# Patient Record
Sex: MALE | Race: WHITE
[De-identification: names, ages, dates, MRNs, and addresses within clinical notes are randomized per-mention and may not be internally consistent; named-entity substitution may affect disease eponyms.]

---

## 2017-01-27 ENCOUNTER — HOSPITAL ENCOUNTER (EMERGENCY)
Dept: HOSPITAL 62 - ER | Age: 67
Discharge: HOME | End: 2017-01-27
Payer: OTHER GOVERNMENT

## 2017-01-27 VITALS — DIASTOLIC BLOOD PRESSURE: 84 MMHG | SYSTOLIC BLOOD PRESSURE: 126 MMHG

## 2017-01-27 DIAGNOSIS — Z90.49: ICD-10-CM

## 2017-01-27 DIAGNOSIS — Z96.641: ICD-10-CM

## 2017-01-27 DIAGNOSIS — I10: ICD-10-CM

## 2017-01-27 DIAGNOSIS — K59.00: Primary | ICD-10-CM

## 2017-01-27 PROCEDURE — 99283 EMERGENCY DEPT VISIT LOW MDM: CPT

## 2017-01-27 PROCEDURE — 74000: CPT

## 2017-01-27 NOTE — ER DOCUMENT REPORT
ED GI/





- General


Mode of Arrival: Ambulatory


Information source: Patient


TRAVEL OUTSIDE OF THE [place] IN LAST 30 DAYS: No





- HPI


Patient complains to provider of: Other - Constipation


Timing/Duration: Gradual, Worse


Associated symptoms: None





<MILDRED GOINS - Last Filed: 17 10:10>





<ADAN PENN - Last Filed: 17 11:48>





- General


Chief Complaint: Constipation


Stated Complaint: POSSIBLE CONSTIPATION


Notes: 


Patient is a 66-year-old male presenting to the emergency department concerned 

of constipation.  Patient states that his last bowel movement was last week.  

Patient has a history of constipation, and states that his last episode was a 

few months ago, but it was not this bad.  Patient was seen here as well 2015 for constipation.  Patient denies taking any daily medications for his 

bowel movements, but states he is going to start taking something after this 

episode. 


 (MILDRED GOINS)





- Related Data


Allergies/Adverse Reactions: 


 





No Known Allergies Allergy (Verified 17 09:01)


 











Past Medical History





- General


Information source: Patient





- Social History


Smoking Status: Unknown if Ever Smoked


Chew tobacco use (# tins/day): No


Frequency of alcohol use: None


Drug Abuse: None


Family History: Reviewed & Not Pertinent, CAD - Mother  from heart problems

, Other - Father  from alcoholic cirrhosis


Patient has suicidal ideation: No


Patient has homicidal ideation: No





- Past Medical History


Cardiac Medical History: Reports: Hx Hypertension


GI Medical History: Reports: Hx Gastroesophageal Reflux Disease, Hx Hepatitis


Musculoskeltal Medical History: Reports Hx Arthritis


Traumatic Medical History: Reports: Hx Spine Fracture


Infectious Medical History: Reports: Hx Hepatitis


Past Surgical History: Reports: Hx Cholecystectomy, Hx Nose Surgery, Hx 

Orthopedic Surgery - Total right shoulder, total right hip, vertebrae





- Immunizations


Hx Diphtheria, Pertussis, Tetanus Vaccination: Yes





<MILDRED GOINS - Last Filed: 17 10:10>





Review of Systems





- Review of Systems


Constitutional: No symptoms reported


EENT: No symptoms reported


Cardiovascular: No symptoms reported


Respiratory: No symptoms reported


Gastrointestinal: See HPI, Constipation


Genitourinary: No symptoms reported


Male Genitourinary: No symptoms reported


Musculoskeletal: No symptoms reported


Skin: No symptoms reported


Hematologic/Lymphatic: No symptoms reported


Neurological/Psychological: No symptoms reported


-: Yes All other systems reviewed and negative





<MILDRED GOINS - Last Filed: 17 10:10>





Physical Exam





- Vital signs


Interpretation: Normal





- General


General appearance: Appears well, Alert





- HEENT


Head: Normocephalic, Atraumatic


Eyes: Normal


Pupils: PERRL





- Respiratory


Respiratory status: No respiratory distress


Breath sounds: Normal





- Cardiovascular


Rhythm: Regular





- Abdominal


Inspection: Normal


Distension: No distension


Bowel sounds: Normal


Tenderness: Nontender





- Back


Back: Normal





- Extremities


General upper extremity: Normal inspection


General lower extremity: Normal inspection





- Neurological


Neuro grossly intact: Yes


Cognition: Normal


Xavier Coma Scale Eye Opening: Spontaneous


Xavier Coma Scale Verbal: Oriented


Xavier Coma Scale Motor: Obeys Commands


Xavier Coma Scale Total: 15


Speech: Normal





- Psychological


Associated symptoms: Normal affect, Normal mood





- Skin


Skin Temperature: Warm


Skin Moisture: Dry


Skin Color: Normal





<MILDRED GOINS - Last Filed: 17 10:10>





Course





<MILDRED GOINS - Last Filed: 17 10:10>





- Diagnostic Test


Radiology reviewed: Image reviewed, Reports reviewed - Moderate stool 

throughout the colon





<ADAN PENN - Last Filed: 17 11:48>





- Re-evaluation


Re-evalutation: 





17 11:46


Patient reports good results from the enema, states he feels like there is more 

up inside there. (ADAN PENN)





- Vital Signs


Vital signs: 


 











Temp Pulse Resp BP Pulse Ox


 


 97.9 F   75   16   112/72   96 


 


 17 09:01  17 09:01  17 09:01  17 09:01  17 09:01








 (MILDRED GOINS)


 (ADAN PENN)





Discharge





<MILDRED GOINS - Last Filed: 17 10:10>





<ADAN PENN - Last Filed: 17 11:48>





- Discharge


Clinical Impression: 


Constipation


Qualifiers:


 Constipation type: unspecified constipation type Qualified Code(s): K59.00 - 

Constipation, unspecified





Condition: Stable


Disposition: HOME, SELF-CARE


Additional Instructions: 


Constipation:





     Constipation is a common problem.  It is especially likely as you get 

older.  Constipation is a common cause of abdominal pain, but sometimes causes 

no symptoms at all.  Causes of constipation include certain medications, 

dehydration, diets, inactivity, and low-fiber intake.  Rarely, it can be a 

symptom of underlying disease.  The physician has evaluated you for this.


     Avoid constipation by eating a diet high in fiber, fruits, and vegetables.

  Drink plenty of liquids.  Get regular exercise.  If possible, avoid 

constipating medicines like narcotic pain medication.  Some vitamin tablets can 

cause constipation.  Stool softeners may be needed for difficult cases.  An 

excellent stool softener is Konsyl which is available at [employer] [facility] [employer].  Just add a teaspoon to a glass of pineapple or orange 

juice daily or twice a day if needed.


   Laxatives are useful for occasional constipation.  You should use them only 

when necessary.  Too-frequent use can make your bowels dependent on them.  Some 

over the counter laxatives available without prescription are:





   Milk of Magnesia, 1-2 tablespoons twice a day


    Dulcolax, 5 mg pill or 10 mg suppository.


   Citrate of Magnesia, 4-5 ounces a day for a day or two





For acute constipation, Fleet's Enemas and Dulcolax suppositories are helpful.


     Chronic, long term  use of laxatives or enemas is not a good idea.  Your 

bowel may become dependant on them.  You do not need to have a bowel movement 

every day.  Many people do fine with a bowel movement every three or four days.


     You should call your doctor or return for re-evaluation if you pass blood 

in the stool, or if you develop fever or increasing abdominal pain.








TAKE MiraLax EVERY DAY.


DRINK PLENTY OF WATER.


IT MAY TAKE A FEW DAYS TO GET THE BOWELS MOVING WELL.


WALKING HELPS KEEP THE BOWELS MOVING.


FOLLOW UP WITH YOUR DOCTOR IF NOT IMPROVING.





RETURN TO THE EMERGENCY ROOM IF ANY NEW OR WORSENING SYMPTOMS.








Scribe Attestation: 





17 11:48


I personally performed the services described in the documentation, reviewed 

and edited the documentation which was dictated to the scribe in my presence, 

and it accurately records my words and actions. (ADAN PENN)





Scribe Documentation





- Scribe


Written by Kendrick:: Mildred Goins 2017 [id number]


acting as scribe for :: Yuri





<MILDRED GOINS - Last Filed: 17 10:10>

## 2017-05-17 ENCOUNTER — HOSPITAL ENCOUNTER (EMERGENCY)
Dept: HOSPITAL 62 - ER | Age: 67
Discharge: LEFT BEFORE DISCHARGE | End: 2017-05-17
Payer: OTHER GOVERNMENT

## 2017-05-17 DIAGNOSIS — Z53.21: Primary | ICD-10-CM

## 2018-10-25 ENCOUNTER — HOSPITAL ENCOUNTER (INPATIENT)
Dept: HOSPITAL 62 - ER | Age: 68
LOS: 8 days | Discharge: NURSING, REHAB OR LONG-TERM CARE | DRG: 65 | End: 2018-11-02
Attending: INTERNAL MEDICINE | Admitting: INTERNAL MEDICINE
Payer: OTHER GOVERNMENT

## 2018-10-25 DIAGNOSIS — G81.94: ICD-10-CM

## 2018-10-25 DIAGNOSIS — K21.9: ICD-10-CM

## 2018-10-25 DIAGNOSIS — Z79.899: ICD-10-CM

## 2018-10-25 DIAGNOSIS — G89.4: ICD-10-CM

## 2018-10-25 DIAGNOSIS — Z90.49: ICD-10-CM

## 2018-10-25 DIAGNOSIS — Z82.49: ICD-10-CM

## 2018-10-25 DIAGNOSIS — I10: ICD-10-CM

## 2018-10-25 DIAGNOSIS — Z81.1: ICD-10-CM

## 2018-10-25 DIAGNOSIS — K75.9: ICD-10-CM

## 2018-10-25 DIAGNOSIS — F03.90: ICD-10-CM

## 2018-10-25 DIAGNOSIS — F11.20: ICD-10-CM

## 2018-10-25 DIAGNOSIS — M19.90: ICD-10-CM

## 2018-10-25 DIAGNOSIS — R13.10: ICD-10-CM

## 2018-10-25 DIAGNOSIS — F32.9: ICD-10-CM

## 2018-10-25 DIAGNOSIS — I63.411: Primary | ICD-10-CM

## 2018-10-25 DIAGNOSIS — K59.09: ICD-10-CM

## 2018-10-25 LAB
ADD MANUAL DIFF: NO
ALBUMIN SERPL-MCNC: 4.4 G/DL (ref 3.5–5)
ALP SERPL-CCNC: 114 U/L (ref 38–126)
ALT SERPL-CCNC: 53 U/L (ref 21–72)
ANION GAP SERPL CALC-SCNC: 15 MMOL/L (ref 5–19)
APTT BLD: 31.7 SEC (ref 23.5–35.8)
AST SERPL-CCNC: 29 U/L (ref 17–59)
BASOPHILS # BLD AUTO: 0 10^3/UL (ref 0–0.2)
BASOPHILS NFR BLD AUTO: 0.3 % (ref 0–2)
BILIRUB DIRECT SERPL-MCNC: 0.2 MG/DL (ref 0–0.4)
BILIRUB SERPL-MCNC: 0.7 MG/DL (ref 0.2–1.3)
BUN SERPL-MCNC: 10 MG/DL (ref 7–20)
CALCIUM: 9.8 MG/DL (ref 8.4–10.2)
CHLORIDE SERPL-SCNC: 104 MMOL/L (ref 98–107)
CK MB SERPL-MCNC: 0.85 NG/ML (ref ?–4.55)
CK SERPL-CCNC: 54 U/L (ref 55–170)
CO2 SERPL-SCNC: 26 MMOL/L (ref 22–30)
EOSINOPHIL # BLD AUTO: 0.1 10^3/UL (ref 0–0.6)
EOSINOPHIL NFR BLD AUTO: 0.8 % (ref 0–6)
ERYTHROCYTE [DISTWIDTH] IN BLOOD BY AUTOMATED COUNT: 13.7 % (ref 11.5–14)
GLUCOSE SERPL-MCNC: 121 MG/DL (ref 75–110)
HCT VFR BLD CALC: 44.3 % (ref 37.9–51)
HGB BLD-MCNC: 15 G/DL (ref 13.5–17)
INR PPP: 0.92
LYMPHOCYTES # BLD AUTO: 1.7 10^3/UL (ref 0.5–4.7)
LYMPHOCYTES NFR BLD AUTO: 15.2 % (ref 13–45)
MCH RBC QN AUTO: 31 PG (ref 27–33.4)
MCHC RBC AUTO-ENTMCNC: 34 G/DL (ref 32–36)
MCV RBC AUTO: 91 FL (ref 80–97)
MONOCYTES # BLD AUTO: 0.7 10^3/UL (ref 0.1–1.4)
MONOCYTES NFR BLD AUTO: 6.1 % (ref 3–13)
NEUTROPHILS # BLD AUTO: 8.7 10^3/UL (ref 1.7–8.2)
NEUTS SEG NFR BLD AUTO: 77.6 % (ref 42–78)
PLATELET # BLD: 215 10^3/UL (ref 150–450)
POTASSIUM SERPL-SCNC: 4.4 MMOL/L (ref 3.6–5)
PROT SERPL-MCNC: 7.4 G/DL (ref 6.3–8.2)
PROTHROMBIN TIME: 12.8 SEC (ref 11.4–15.4)
RBC # BLD AUTO: 4.85 10^6/UL (ref 4.35–5.55)
SODIUM SERPL-SCNC: 145 MMOL/L (ref 137–145)
TOTAL CELLS COUNTED % (AUTO): 100 %
TROPONIN I SERPL-MCNC: < 0.012 NG/ML
WBC # BLD AUTO: 11.2 10^3/UL (ref 4–10.5)

## 2018-10-25 PROCEDURE — 80053 COMPREHEN METABOLIC PANEL: CPT

## 2018-10-25 PROCEDURE — 85610 PROTHROMBIN TIME: CPT

## 2018-10-25 PROCEDURE — 80061 LIPID PANEL: CPT

## 2018-10-25 PROCEDURE — 85025 COMPLETE CBC W/AUTO DIFF WBC: CPT

## 2018-10-25 PROCEDURE — 85730 THROMBOPLASTIN TIME PARTIAL: CPT

## 2018-10-25 PROCEDURE — 84484 ASSAY OF TROPONIN QUANT: CPT

## 2018-10-25 PROCEDURE — 93306 TTE W/DOPPLER COMPLETE: CPT

## 2018-10-25 PROCEDURE — 82553 CREATINE MB FRACTION: CPT

## 2018-10-25 PROCEDURE — 99285 EMERGENCY DEPT VISIT HI MDM: CPT

## 2018-10-25 PROCEDURE — 93010 ELECTROCARDIOGRAM REPORT: CPT

## 2018-10-25 PROCEDURE — 93880 EXTRACRANIAL BILAT STUDY: CPT

## 2018-10-25 PROCEDURE — 71045 X-RAY EXAM CHEST 1 VIEW: CPT

## 2018-10-25 PROCEDURE — 83735 ASSAY OF MAGNESIUM: CPT

## 2018-10-25 PROCEDURE — 82550 ASSAY OF CK (CPK): CPT

## 2018-10-25 PROCEDURE — L4386 NON-PNEUM WALK BOOT PRE CST: HCPCS

## 2018-10-25 PROCEDURE — 93005 ELECTROCARDIOGRAM TRACING: CPT

## 2018-10-25 PROCEDURE — 70551 MRI BRAIN STEM W/O DYE: CPT

## 2018-10-25 PROCEDURE — 36415 COLL VENOUS BLD VENIPUNCTURE: CPT

## 2018-10-25 PROCEDURE — 70450 CT HEAD/BRAIN W/O DYE: CPT

## 2018-10-25 PROCEDURE — 82962 GLUCOSE BLOOD TEST: CPT

## 2018-10-25 PROCEDURE — 87086 URINE CULTURE/COLONY COUNT: CPT

## 2018-10-25 NOTE — RADIOLOGY REPORT (SQ)
PROCEDURE: CT OF THE HEAD WITHOUT INTRAVENOUS CONTRAST



HISTORY: stroke SXs



Indication: Same as above



Comparison: None



Technique: The study was done on 10/25/2018 at 9:35 PM



CT of the head was done without intravenous contrast was done in

the orthogonal planes.



This exam was performed according to our departmental

dose-optimization program, which includes automated exposure

control, adjustment of the mA and/or KV according to the

patient's size and/or use of iterative reconstruction technique.





FINDINGS: 



There is no intracranial hemorrhage, midline shift mass effect or

acute focal infarct. Note is made of left occipital craniotomy

defect and encephalomalacic change in the adjacent left

cerebellar hemisphere



There is prominence of the sylvian fissures and the cortical

sulci reflecting age related volume loss. 

There is periventricular and deep white matter low attenuation,

most likely related to small vessel white matter ischemic

disease.



If clinical concern exists regarding an acute ischemic/vascular

pathology being responsible for patient's symptomatology, an MRI

of the brain is more sensitive than the current study, in ruling

out such a possibility.



There is good gray/white matter differentiation.



The ventricular system is normal.



The mastoid air cells are unremarkable . 



The paranasal sinuses show changes of mild chronic sinusitis .



There is no visualization of acute fractures involving the

calvarium or the skull base. 



IMPRESSION: 



There is no acute intracranial abnormality.



Age related and chronic involutional changes are seen.

## 2018-10-25 NOTE — RADIOLOGY REPORT (SQ)
PROCEDURE: XR CHEST 1 VIEW



HISTORY: stroke SXs



COMPARISON: None 



TECHNIQUE: The study was done on 10/25/2018 at 9:44 PM local time



Single projection of the chest was done.



FINDINGS:



The lung fields are well inflated .



There are no discrete airspace infiltrates, pneumothoraces or

pleural effusions.



The pulmonary vascularity is normal.



The cardiomediastinal silhouette is unremarkable for patient's

age and sex.



IMPRESSION: 



There is no acute pleural-parenchymal process seen in the imaged

lung fields.



Location of Interpretation: Teleradiology

## 2018-10-25 NOTE — EKG REPORT
SEVERITY:- ABNORMAL ECG -

SINUS RHYTHM

BORDERLINE LEFT AXIS DEVIATION

NONSPECIFIC T ABNORMALITIES, ANT-LAT LEADS

:

Confirmed by: Jeff Salmeron 25-Oct-2018 22:00:59

## 2018-10-25 NOTE — ER DOCUMENT REPORT
ED General





- General


Chief Complaint: S/S of Possible Stroke


Stated Complaint: POSSIBLE STROKE


Time Seen by Provider: 10/25/18 21:45


Cannot obtain history due to: Dementia


Notes: 





Patient is a 68-year-old male with a past medical history of hypertension, 

dementia, depression, who presents after being found down on the floor 

approximately 1 hour prior to arrival.  Last seen normal at 1530.  Patient is a 

very poor historian secondary to his baseline dementia.  Family states that he 

has no history of the same.  They state at baseline he would not know month, 

location but with no family names that he does currently.  The patient 

currently denies any pain.  He denies any symptoms or concerns.  Family states 

there concerned about left-sided weakness.  [facility]


TRAVEL OUTSIDE OF THE [place] IN LAST 30 DAYS: No





- Related Data


Allergies/Adverse Reactions: 


 





No Known Allergies Allergy (Verified 17 09:01)


 











Past Medical History





- General


Information source: Patient, Relative


Cannot obtain history due to: Dementia





- Social History


Smoking Status: Never Smoker


Frequency of alcohol use: None


Drug Abuse: None


Lives with: Family


Family History: Reviewed & Not Pertinent, CAD - Mother  from heart problems

, Other - Father  from alcoholic cirrhosis


Patient has suicidal ideation: No


Patient has homicidal ideation: No





- Past Medical History


Cardiac Medical History: Reports: Hx Hypertension


Renal/ Medical History: Denies: Hx Peritoneal Dialysis


GI Medical History: Reports: Hx Gastroesophageal Reflux Disease, Hx Hepatitis


Musculoskeletal Medical History: Reports Hx Arthritis


Traumatic Medical History: Reports: Hx Spine Fracture


Infectious Medical History: Reports: Hx Hepatitis


Past Surgical History: Reports: Hx Cholecystectomy, Hx Nose Surgery, Hx 

Orthopedic Surgery - Total right shoulder, total right hip, vertebrae





- Immunizations


Hx Diphtheria, Pertussis, Tetanus Vaccination: Yes





Review of Systems





- Review of Systems


Notes: 





Constitutional: Negative for fever.


HENT: Negative for sore throat.


Eyes: Negative for visual changes.


Cardiovascular: Negative for chest pain.


Respiratory: Negative for shortness of breath.


Gastrointestinal: Negative for abdominal pain, vomiting or diarrhea.


Genitourinary: Negative for dysuria.


Musculoskeletal: Negative for back pain.


Skin: Negative for rash.


Neurological: Negative for headaches, positive for left-sided weakness and 

neglect





10 point ROS negative except as marked above and in HPI.





Physical Exam





- Vital signs


Vitals: 


 











Pulse Resp BP Pulse Ox


 


 63   14   162/96 H  97 


 


 10/25/18 22:15  10/25/18 22:15  10/25/18 22:15  10/25/18 22:15











Interpretation: Hypertensive


Notes: 





PHYSICAL EXAMINATION:





GENERAL: Appears older than stated age.  In no acute distress





HEAD: Atraumatic, normocephalic.





EYES: Pupils equal round and reactive to light, extraocular movements intact, 

sclera anicteric, conjunctiva are normal.





ENT: nares patent, oropharynx clear without exudates.  Moderate dry mucous 

membranes.





NECK: Normal range of motion, supple without lymphadenopathy





LUNGS: Breath sounds clear to auscultation bilaterally and equal.  No wheezes 

rales or rhonchi.





HEART: Regular rate and rhythm without murmurs





ABDOMEN: Soft, nontender, normoactive bowel sounds.  No guarding, no rebound.  

No masses appreciated.





EXTREMITIES: Normal range of motion, no pitting or edema.  No cyanosis.





NEUROLOGICAL:  The patient struggles to turn attention to left-sided strength 

testing, however when he does begin to turn attention to the left side his 

strength is approximately 4- out of 5 in both the biceps and triceps as well as 

distally and proximally in the left lower extremity versus 5 out of 5 both 

distally and proximally in the right upper and lower extremity.  He has a very 

subtle left-sided facial droop.  He does fail his swallow screen with dribbling 

of fluid out of the corner of his left mouth.  No aphasia or dysarthria.  

Extraocular motions intact.  Pupillary reflex intact.





PSYCH: Alert, oriented to person only





SKIN: Warm, Dry, normal turgor, no rashes or lesions noted.





Course





- Re-evaluation


Re-evalutation: 





10/25/18 22:27


Patient presents with signs and symptoms consistent with an acute right-sided 

MCA distribution stroke with left-sided weakness and partial neglect.  The 

patient struggles to turn attention to left-sided strength testing, however 

when he does begin to turn attention to the left side his strength is 

approximately 4- out of 5 in both the biceps and triceps as well as distally 

and proximally in the left lower extremity versus 5 out of 5 both distally and 

proximally in the right upper and lower extremity.  He has a very subtle left-

sided facial droop.  He does fail his swallow screen with dribbling of fluid 

out of the corner of his left mouth.  No aphasia or dysarthria.  He does have 

some dementia at baseline per the family, would not routinely know the year, 

location or month.  Patient symptoms are uncertain the time of onset the last 

known normal was 1530 placing him outside of the window for TPA.  There is no 

evidence of hemorrhagic infarction on CT.  Labs are pending.  Patient will 

require hospitalization given new onset of stroke.  Rectal aspirin has been 

administered.


10/26/18 00:17


I have discussed this case with Dr. Delcid who has accepted the patient for 

admission.





- Vital Signs


Vital signs: 


 











Temp Pulse Resp BP Pulse Ox


 


    63   14   162/96 H  97 


 


    10/25/18 22:15  10/25/18 22:15  10/25/18 22:15  10/25/18 22:15














- Laboratory


Result Diagrams: 


 10/25/18 22:00





 10/25/18 22:00


Laboratory results interpreted by me: 


 











  10/25/18 10/25/18 10/25/18





  21:45 22:00 22:00


 


WBC   11.2 H 


 


Absolute Neutrophils   8.7 H 


 


Glucose    121 H


 


POC Glucose  127 H  


 


Creatine Kinase    54 L














- Diagnostic Test


Radiology reviewed: Image reviewed, Reports reviewed


Radiology results interpreted by me: 





10/25/18 23:00


CT head: No acute intracranial bleed





Chest x-ray: No acute infiltrate





Discharge





- Discharge


Clinical Impression: 


 Acute embolic stroke, Left-sided neglect, Left-sided weakness





Opiate dependence


Qualifiers:


 Substance use status: uncomplicated Qualified Code(s): F11.20 - Opioid 

dependence, uncomplicated





Condition: Fair


Disposition: ADMITTED AS INPATIENT


Unit Admitted: [facility]

## 2018-10-26 LAB
CHOLEST SERPL-MCNC: 170.93 MG/DL (ref 0–200)
CK MB SERPL-MCNC: 0.51 NG/ML (ref ?–4.55)
CK MB SERPL-MCNC: 0.53 NG/ML (ref ?–4.55)
CK MB SERPL-MCNC: 0.57 NG/ML (ref ?–4.55)
CK SERPL-CCNC: 44 U/L (ref 55–170)
LDLC SERPL DIRECT ASSAY-MCNC: 97 MG/DL (ref ?–100)
TRIGL SERPL-MCNC: 254 MG/DL (ref ?–150)
TROPONIN I SERPL-MCNC: < 0.012 NG/ML
VLDLC SERPL CALC-MCNC: 50.8 MG/DL (ref 10–31)

## 2018-10-26 RX ADMIN — MORPHINE SULFATE PRN MG: 10 INJECTION INTRAMUSCULAR; INTRAVENOUS; SUBCUTANEOUS at 19:56

## 2018-10-26 RX ADMIN — MORPHINE SULFATE PRN MG: 10 INJECTION INTRAMUSCULAR; INTRAVENOUS; SUBCUTANEOUS at 07:48

## 2018-10-26 RX ADMIN — SODIUM CHLORIDE PRN MLS/HR: 9 INJECTION, SOLUTION INTRAVENOUS at 01:57

## 2018-10-26 RX ADMIN — HEPARIN SODIUM SCH: 5000 INJECTION, SOLUTION INTRAVENOUS; SUBCUTANEOUS at 15:56

## 2018-10-26 RX ADMIN — MORPHINE SULFATE PRN MG: 10 INJECTION INTRAMUSCULAR; INTRAVENOUS; SUBCUTANEOUS at 11:30

## 2018-10-26 RX ADMIN — Medication SCH ML: at 22:19

## 2018-10-26 RX ADMIN — ASPIRIN SCH MG: 300 SUPPOSITORY RECTAL at 16:02

## 2018-10-26 RX ADMIN — SODIUM CHLORIDE PRN MLS/HR: 9 INJECTION, SOLUTION INTRAVENOUS at 08:09

## 2018-10-26 RX ADMIN — ATORVASTATIN CALCIUM SCH MG: 80 TABLET, FILM COATED ORAL at 22:18

## 2018-10-26 RX ADMIN — Medication SCH: at 05:03

## 2018-10-26 RX ADMIN — HEPARIN SODIUM SCH UNIT: 5000 INJECTION, SOLUTION INTRAVENOUS; SUBCUTANEOUS at 05:50

## 2018-10-26 RX ADMIN — MORPHINE SULFATE PRN MG: 10 INJECTION INTRAMUSCULAR; INTRAVENOUS; SUBCUTANEOUS at 14:04

## 2018-10-26 RX ADMIN — MORPHINE SULFATE PRN MG: 10 INJECTION INTRAMUSCULAR; INTRAVENOUS; SUBCUTANEOUS at 01:31

## 2018-10-26 RX ADMIN — MORPHINE SULFATE PRN MG: 10 INJECTION INTRAMUSCULAR; INTRAVENOUS; SUBCUTANEOUS at 16:40

## 2018-10-26 RX ADMIN — Medication SCH: at 15:58

## 2018-10-26 RX ADMIN — LORAZEPAM PRN MG: 2 INJECTION INTRAMUSCULAR; INTRAVENOUS at 09:30

## 2018-10-26 RX ADMIN — LORAZEPAM PRN MG: 2 INJECTION INTRAMUSCULAR; INTRAVENOUS at 20:00

## 2018-10-26 RX ADMIN — DOCUSATE SODIUM PRN MG: 100 CAPSULE, LIQUID FILLED ORAL at 22:21

## 2018-10-26 RX ADMIN — MORPHINE SULFATE PRN MG: 10 INJECTION INTRAMUSCULAR; INTRAVENOUS; SUBCUTANEOUS at 09:30

## 2018-10-26 RX ADMIN — GABAPENTIN SCH MG: 300 CAPSULE ORAL at 22:18

## 2018-10-26 RX ADMIN — MORPHINE SULFATE PRN MG: 10 INJECTION INTRAMUSCULAR; INTRAVENOUS; SUBCUTANEOUS at 22:25

## 2018-10-26 RX ADMIN — GABAPENTIN SCH: 300 CAPSULE ORAL at 05:44

## 2018-10-26 RX ADMIN — MORPHINE SULFATE PRN MG: 10 INJECTION INTRAMUSCULAR; INTRAVENOUS; SUBCUTANEOUS at 14:55

## 2018-10-26 RX ADMIN — HEPARIN SODIUM SCH UNIT: 5000 INJECTION, SOLUTION INTRAVENOUS; SUBCUTANEOUS at 22:19

## 2018-10-26 RX ADMIN — GABAPENTIN SCH: 300 CAPSULE ORAL at 15:58

## 2018-10-26 NOTE — RADIOLOGY REPORT (SQ)
EXAM DESCRIPTION:  MRI HEAD WITHOUT



COMPLETED DATE/TIME:  10/26/2018 12:18 pm



REASON FOR STUDY:  cva



COMPARISON:  CT dated 10/25/2018 at 2134 hours.



TECHNIQUE:  Multiplanar imaging includes non-contrasted T1, T2, FLAIR, and diffusion with ADC map seq
uences.  Images stored on PACS.



LIMITATIONS:  None.



FINDINGS:  ANATOMY: No anomalies. Normal vascular flow voids. Pituitary fossa normal.

CSF SPACES: Atrophy induced prominence of ventricles and CSF spaces.

CEREBRUM: High signal intensity lesions scattered throughout the white matter on FLAIR imaging with d
istribution suggesting micro-vascular ischemic changes.  No evidence of hemorrhage, mass, or extraaxi
al fluid collection.

POSTERIOR FOSSA: Surgical changes in the left posterior fossa with postoperative change in the left c
erebellar hemisphere.  No signal alteration. No hemorrhage. No edema, masses or mass effect.  Interna
l auditory canals, cerebello-pontine angles, mastoids normal.

DIFFUSION IMAGING: Restricted diffusion in the right parietal and temporal lobe.

ORBITS: No masses. Globes normal.

PARANASAL  SINUSES: No fluid levels.  Mucosa normal.

OTHER: No other significant finding.



IMPRESSION:

1. RESTRICTED DIFFUSION IN THE RIGHT PARIETAL AND TEMPORAL LOBE CONSISTENT WITH ACUTE INFARCT.

2. SURGICAL CHANGES INVOLVING THE LEFT CEREBELLUM.

3. ATROPHY AND CHRONIC MICRO-VASCULAR ISCHEMIC CHANGES.

EVIDENCE OF ACUTE STROKE: YES.  RIGHT MCA.



TECHNICAL DOCUMENTATION:  JOB ID:  [id number]

 2011 [employer]- All Rights Reserved



Reading location - IP/workstation name: [email or web address]

## 2018-10-26 NOTE — PDOC H&P
History of Present Illness


Admission Date/PCP: 


  10/26/18 00:26





  MIHIR BRYD MD





Patient complains of: Left-sided weakness and difficulty with speech


History of Present Illness: 


MACKENZIE LEZAMA JR is a 68 year old male with a past medical history of 

hypertension, dementia, depression and opiate dependent chronic pain.  He 

presents after being found down last seen normal at 1530.  He is found by his 

son on the floor with dysarthria, left-sided weakness and hemineglect that 

persists.  Patient is a poor historian, only oriented to self and family at 

baseline.  Patient's son administers medications and denies recent changes.  No 

history of CVA, atrial fibrillation or palpitations.   Initial CT imaging is 

unremarkable.  He is out of the window for TPA, fails a swallow trial, receives 

NH aspirin and referred to the hospitalist for admission.





Past Medical History


Cardiac Medical History: Reports: Hypertension


GI Medical History: Reports: Gastroesophageal Reflux Disease, Hepatitis


Musculoskeltal Medical History: Reports: Arthritis


Psychiatric Medical History: Reports: Dementia





Past Surgical History


Past Surgical History: Reports: Cholecystectomy, Orthopedic Surgery - Total 

right shoulder, total right hip, vertebrae





Social History


Information Source: Relative, Emergency Med Personnel


Lives with: Family


Smoking Status: Never Smoker


Frequency of Alcohol Use: Social - 1 or 2 beers per day no history of 

withdrawal or seizure


Hx Recreational Drug Use: No


Drugs: None


Hx Prescription Drug Abuse: No





- Advance Directive


Resuscitation Status: Full Code





Family History


Family History: CAD - Mother  from heart problems, Other - Father  from 

alcoholic cirrhosis


Parental Family History Reviewed: Yes


Children Family History Reviewed: Yes


Sibling(s) Family History Reviewed.: Yes





Medication/Allergy


Home Medications: 








Atenolol [Tenormin 25 mg Tablet] 1 tab PO BID 03/03/15 


Baclofen [Baclofen 10 mg Tablet] 1 tab PO BID PRN 03/03/15 


Benzonatate [Tessalon Perles 100 mg Capsule] 1 cap PO TID PRN 03/03/15 


Gabapentin [Neurontin 300 mg Capsule] 900 mg PO Q8 03/03/15 


Loratadine 10 mg PO DAILY 03/03/15 


Pantoprazole Sodium 40 mg PO DAILY 03/03/15 


Trazodone HCl [Desyrel 50 mg Tablet] 3 tab PO QHS 03/03/15 


Morphine Sulfate [Ms-Contin Sr 15 Mg Tablet.Sa] 30 mg PO BID PRN 12/19/15 


Oxycodone HCl 5 mg PO TID PRN 12/19/15 








Allergies/Adverse Reactions: 


 





No Known Allergies Allergy (Verified 17 09:01)


 











Review of Systems


ROS unobtainable: Due to mental status





Physical Exam


Vital Signs: 


 











Temp Pulse Resp BP Pulse Ox


 


 98.0 F   62   14   165/75 H  99 


 


 10/26/18 02:37  10/26/18 04:00  10/26/18 04:00  10/26/18 04:00  10/26/18 04:00








 Intake & Output











 10/24/18 10/25/18 10/26/18





 11:59 11:59 11:59


 


Weight   83.1 kg











General appearance: PRESENT: cooperative, disheveled, mild distress


Head exam: PRESENT: atraumatic, normocephalic


Eye exam: PRESENT: conjunctiva pink, EOMI, PERRLA.  ABSENT: scleral icterus


Ear exam: PRESENT: normal external ear exam


Mouth exam: PRESENT: dry mucosa, neck supple, other - Left facial droop.  ABSENT

: laceration, moist


Neck exam: ABSENT: carotid bruit, JVD, lymphadenopathy, thyromegaly


Respiratory exam: PRESENT: clear to auscultation gautam.  ABSENT: rales, rhonchi, 

wheezes


Cardiovascular exam: PRESENT: RRR.  ABSENT: diastolic murmur, rubs, systolic 

murmur


Pulses: PRESENT: normal dorsalis pedis pul


Vascular exam: PRESENT: normal capillary refill


GI/Abdominal exam: PRESENT: normal bowel sounds, soft.  ABSENT: distended, 

guarding, mass, organolmegaly, rebound, tenderness


Rectal exam: PRESENT: deferred


Extremities exam: PRESENT: full ROM, other - Left-sided 4+ out of 5 strength.  

ABSENT: calf tenderness, clubbing, pedal edema


Neurological exam: PRESENT: alert, awake, oriented to person, aphasic - 

Expressive aphasia, possible receptive aphasia, other - Left hemineglect.  

ABSENT: CN II-XII grossly intact - Left-sided facial droop, unable to swallow, 

motor sensory deficit


Psychiatric exam: PRESENT: appropriate affect, normal mood.  ABSENT: homicidal 

ideation, suicidal ideation


Skin exam: PRESENT: dry, intact, warm.  ABSENT: cyanosis, rash





Results


Impressions: 


 





Chest X-Ray  10/25/18 21:35


IMPRESSION: 


 


There is no acute pleural-parenchymal process seen in the imaged


lung fields.


 


Location of Interpretation: Teleradiology


 








Head CT  10/25/18 21:35


IMPRESSION: 


 


There is no acute intracranial abnormality.


 


Age related and chronic involutional changes are seen.


 














Assessment & Plan





- Diagnosis


(1) Acute embolic stroke


Is this a current diagnosis for this admission?: Yes   


Plan: 


CVA care set, out of the window for thrombolytic, NH aspirin, speech and 

occupational therapy, follow-up MRI, carotid Doppler








(2) Hypertension


Is this a current diagnosis for this admission?: Yes   


Plan: 


Permissive hypertension, hydralazine as needed systolic pressure greater than 

190.








(3) Left-sided neglect


Is this a current diagnosis for this admission?: Yes   


Plan: 


Physical therapy, occupational therapy ordered








(4) Opiate dependence


Qualifiers: 


   Substance use status: uncomplicated   Qualified Code(s): F11.20 - Opioid 

dependence, uncomplicated   


Is this a current diagnosis for this admission?: Yes   


Plan: 


Chronic back pain, low-dose IV morphine every 3 hours as needed








(5) Chronic constipation


Is this a current diagnosis for this admission?: Yes   


Plan: 


Bowel regiment, Fleet enema as needed








- Time


Time Spent: 50 to 70 Minutes





- Inpatient Certification


Medical Necessity: Need Close Monitoring Due to Risk of Patient Decompensation

## 2018-10-26 NOTE — RADIOLOGY REPORT (SQ)
EXAM DESCRIPTION:  CAROTID DOPPLER



COMPLETED DATE/TIME:  10/26/2018 1:22 pm



REASON FOR STUDY:  cva



COMPARISON:  None.



TECHNIQUE:  Grayscale ultrasound, Doppler velocity and spectra, and color Doppler images acquired of 
the extra-cranial carotid and vertebral arteries. Images stored on PACS.



LIMITATIONS:  None.



FINDINGS:  RIGHT CAROTID

CCA Velocities: 58 centimeters/second

ICA Velocities

Peak systolic 95 cm/s.

End diastolic 30 cm/s.

Proximal ICA/CCA peak systolic ratio 1.6.

There is a prominent plaque at the origin of the right internal carotid artery.

LEFT CAROTID

CCA Velocities: 72 centimeters/second

ICA Velocities

Peak systolic 93 cm/s.

End diastolic 23 cm/s.

Proximal ICA/CCA peak systolic ratio 1.3.

There is a prominent plaque in the carotid bulb.

VERTEBRAL ARTERIES: Antegrade flow.  Normal waveforms.

SUBCLAVIAN ARTERIES: No finding.

OTHER: No other significant finding.



IMPRESSION:  NO HEMODYNAMICALLY SIGNIFICANT STENOSIS.



COMMENT:  Quality ID #[id number]:  Velocity criteria are extrapolated from the diameter data as defined by t
he Society of Radiologists in Ultrasound Consensus Conference. Radiology 2003: 229; 340-346.



TECHNICAL DOCUMENTATION:  JOB ID:  [id number]

 2011 [employer]- All Rights Reserved



Reading location - IP/workstation name: [email or web address]

## 2018-10-26 NOTE — PDOC PROGRESS REPORT
Subjective


Progress Note for:: 10/26/18


Subjective:: 





The patient has been consistently complaining of pain.  It is diffuse but he 

also seems to indicate lower abdomen.


Reason For Visit: 


CVA HTN DEMENTIA








Physical Exam


Vital Signs: 


 











Temp Pulse Resp BP Pulse Ox


 


 99.5 F   71   17   137/65 H  98 


 


 10/26/18 11:37  10/26/18 11:37  10/26/18 11:37  10/26/18 11:37  10/26/18 11:37








 Intake & Output











 10/25/18 10/26/18 10/27/18





 06:59 06:59 06:59


 


Intake Total  1000 118


 


Output Total   300


 


Balance  1000 -182


 


Weight  83.1 kg 











General appearance: PRESENT: other - The patient is very restless in bed 

secondary to his pain.


Head exam: PRESENT: atraumatic, normocephalic


Eye exam: PRESENT: other - Patient cooperation limited.  Unable to assess.


Ear exam: PRESENT: normal external ear exam


Mouth exam: PRESENT: other - Patient cooperation limited.  Unable to assess.


Teeth exam: PRESENT: other - Patient cooperation limited.  Unable to assess.


Neck exam: ABSENT: carotid bruit, JVD, tenderness


Respiratory exam: PRESENT: clear to auscultation gautam, symmetrical.  ABSENT: 

rales, rhonchi, wheezes


Cardiovascular exam: PRESENT: RRR, +S1, +S2, systolic murmur


Pulses: PRESENT: normal carotid pulses, normal radial pulses


GI/Abdominal exam: PRESENT: normal bowel sounds, soft, tenderness - Difficult 

to tell by the patient's response but he does appear to have some tenderness to 

palpation in the suprapubic area.


Rectal exam: PRESENT: deferred


Extremities exam: ABSENT: pedal edema


Neurological exam: PRESENT: awake, other - Patient exhibits dysarthria with left

-sided hemiparesis.  The patient can lift his left leg off the bed and has 1+ 

dorsiflexion and plantar flexion of the foot.  There was no voluntary movement 

in his left arm.  Right side exam was normal..  ABSENT: oriented to time, 

oriented to situation


Psychiatric exam: PRESENT: other - His affect reflects his significant 

discomfort.


Skin exam: PRESENT: dry, intact, warm.  ABSENT: cyanosis, rash





Results


Laboratory Results: 


 











  10/26/18





  04:02


 


Triglycerides  254 H


 


Cholesterol  170.93


 


LDL Cholesterol Direct  97


 


VLDL Cholesterol  50.8 H


 


HDL Cholesterol  23 L








 











  10/26/18 10/26/18 10/26/18





  04:02 04:02 10:40


 


Creatine Kinase   44 L  45 L


 


CK-MB (CK-2)  0.57  


 


Troponin I  < 0.012  














  10/26/18





  10:40


 


Creatine Kinase 


 


CK-MB (CK-2)  0.53


 


Troponin I  < 0.012











Impressions: 


 





Chest X-Ray  10/25/18 21:35


IMPRESSION: 


 


There is no acute pleural-parenchymal process seen in the imaged


lung fields.


 


Location of Interpretation: Teleradiology


 








Head CT  10/25/18 21:35


IMPRESSION: 


 


There is no acute intracranial abnormality.


 


Age related and chronic involutional changes are seen.


 








Head MRI  10/26/18 00:00


IMPRESSION:


1. RESTRICTED DIFFUSION IN THE RIGHT PARIETAL AND TEMPORAL LOBE CONSISTENT WITH 

ACUTE INFARCT.


2. SURGICAL CHANGES INVOLVING THE LEFT CEREBELLUM.


3. ATROPHY AND CHRONIC MICRO-VASCULAR ISCHEMIC CHANGES.


EVIDENCE OF ACUTE STROKE: YES.  RIGHT MCA.


 








Carotid Doppler Study  10/26/18 00:22


IMPRESSION:  NO HEMODYNAMICALLY SIGNIFICANT STENOSIS.


 














Assessment & Plan





- Diagnosis


(1) Acute embolic stroke


Is this a current diagnosis for this admission?: Yes   


Plan: 


The patient is currently on aspirin therapy.  He is scheduled for an MRI later 

today.  We will attempt to maintain normal blood pressure.  We will continue 

his aspirin, statin and antihypertensive therapy at this time.  The patient 

will need skilled nursing placement after discharge.








(2) Dysphagia


Qualifiers: 


   Dysphagia type: unspecified   Qualified Code(s): R13.10 - Dysphagia, 

unspecified   


Is this a current diagnosis for this admission?: Yes   


Plan: 


The patient was seen by speech therapy.  We do not have a modified barium 

swallow for specifics and she feels that his swallow is intact enough to start 

a dysphagia 2 diet with ground meats.  He also has thin liquids available.  The 

staff will continue to monitor the patient and he will need ongoing speech and 

language therapy.








(3) Left-sided weakness


Is this a current diagnosis for this admission?: Yes   


Plan: 


Result of acute stroke.  Continue physical and occupational therapy and plan 

for skilled nursing placement at discharge.








(4) Opiate dependence


Qualifiers: 


   Substance use status: uncomplicated   Qualified Code(s): F11.20 - Opioid 

dependence, uncomplicated   


Is this a current diagnosis for this admission?: Yes   


Plan: 


Family brought in medications today.  The patient normally takes 30 mg of MS 

Contin every 8 hours.  They know that they are trying to administer pills by 

mouth.  Have increased his as needed morphine to treat the pain and avoid 

withdrawal.








- Time


Time Spent with patient: 25-34 minutes


Medications reviewed and adjusted accordingly: Yes

## 2018-10-27 LAB
ADD MANUAL DIFF: NO
ALBUMIN SERPL-MCNC: 3.6 G/DL (ref 3.5–5)
ALP SERPL-CCNC: 105 U/L (ref 38–126)
ALT SERPL-CCNC: 41 U/L (ref 21–72)
ANION GAP SERPL CALC-SCNC: 11 MMOL/L (ref 5–19)
AST SERPL-CCNC: 23 U/L (ref 17–59)
BASOPHILS # BLD AUTO: 0 10^3/UL (ref 0–0.2)
BASOPHILS NFR BLD AUTO: 0.5 % (ref 0–2)
BILIRUB DIRECT SERPL-MCNC: 0.4 MG/DL (ref 0–0.4)
BILIRUB SERPL-MCNC: 2 MG/DL (ref 0.2–1.3)
BUN SERPL-MCNC: 7 MG/DL (ref 7–20)
CALCIUM: 9 MG/DL (ref 8.4–10.2)
CHLORIDE SERPL-SCNC: 106 MMOL/L (ref 98–107)
CO2 SERPL-SCNC: 26 MMOL/L (ref 22–30)
EOSINOPHIL # BLD AUTO: 0.1 10^3/UL (ref 0–0.6)
EOSINOPHIL NFR BLD AUTO: 0.9 % (ref 0–6)
ERYTHROCYTE [DISTWIDTH] IN BLOOD BY AUTOMATED COUNT: 13.7 % (ref 11.5–14)
GLUCOSE SERPL-MCNC: 120 MG/DL (ref 75–110)
HCT VFR BLD CALC: 39.8 % (ref 37.9–51)
HGB BLD-MCNC: 13.8 G/DL (ref 13.5–17)
LYMPHOCYTES # BLD AUTO: 2.8 10^3/UL (ref 0.5–4.7)
LYMPHOCYTES NFR BLD AUTO: 27.2 % (ref 13–45)
MCH RBC QN AUTO: 31 PG (ref 27–33.4)
MCHC RBC AUTO-ENTMCNC: 34.5 G/DL (ref 32–36)
MCV RBC AUTO: 90 FL (ref 80–97)
MONOCYTES # BLD AUTO: 0.8 10^3/UL (ref 0.1–1.4)
MONOCYTES NFR BLD AUTO: 8 % (ref 3–13)
NEUTROPHILS # BLD AUTO: 6.4 10^3/UL (ref 1.7–8.2)
NEUTS SEG NFR BLD AUTO: 63.4 % (ref 42–78)
PLATELET # BLD: 196 10^3/UL (ref 150–450)
POTASSIUM SERPL-SCNC: 3.7 MMOL/L (ref 3.6–5)
PROT SERPL-MCNC: 6.5 G/DL (ref 6.3–8.2)
RBC # BLD AUTO: 4.45 10^6/UL (ref 4.35–5.55)
SODIUM SERPL-SCNC: 142.7 MMOL/L (ref 137–145)
TOTAL CELLS COUNTED % (AUTO): 100 %
WBC # BLD AUTO: 10.2 10^3/UL (ref 4–10.5)

## 2018-10-27 RX ADMIN — GABAPENTIN SCH MG: 300 CAPSULE ORAL at 13:41

## 2018-10-27 RX ADMIN — GABAPENTIN SCH MG: 300 CAPSULE ORAL at 06:14

## 2018-10-27 RX ADMIN — HEPARIN SODIUM SCH UNIT: 5000 INJECTION, SOLUTION INTRAVENOUS; SUBCUTANEOUS at 21:24

## 2018-10-27 RX ADMIN — MORPHINE SULFATE PRN MG: 10 INJECTION INTRAMUSCULAR; INTRAVENOUS; SUBCUTANEOUS at 18:26

## 2018-10-27 RX ADMIN — HEPARIN SODIUM SCH UNIT: 5000 INJECTION, SOLUTION INTRAVENOUS; SUBCUTANEOUS at 13:42

## 2018-10-27 RX ADMIN — DOCUSATE SODIUM PRN MG: 100 CAPSULE, LIQUID FILLED ORAL at 06:14

## 2018-10-27 RX ADMIN — HEPARIN SODIUM SCH UNIT: 5000 INJECTION, SOLUTION INTRAVENOUS; SUBCUTANEOUS at 06:14

## 2018-10-27 RX ADMIN — DOCUSATE SODIUM PRN MG: 100 CAPSULE, LIQUID FILLED ORAL at 21:28

## 2018-10-27 RX ADMIN — MORPHINE SULFATE PRN MG: 10 INJECTION INTRAMUSCULAR; INTRAVENOUS; SUBCUTANEOUS at 21:28

## 2018-10-27 RX ADMIN — ATORVASTATIN CALCIUM SCH MG: 80 TABLET, FILM COATED ORAL at 21:24

## 2018-10-27 RX ADMIN — GABAPENTIN SCH MG: 300 CAPSULE ORAL at 21:24

## 2018-10-27 RX ADMIN — Medication SCH ML: at 21:26

## 2018-10-27 RX ADMIN — Medication SCH: at 19:34

## 2018-10-27 RX ADMIN — MORPHINE SULFATE PRN MG: 10 INJECTION INTRAMUSCULAR; INTRAVENOUS; SUBCUTANEOUS at 02:43

## 2018-10-27 RX ADMIN — ASPIRIN SCH: 300 SUPPOSITORY RECTAL at 19:34

## 2018-10-27 RX ADMIN — ASPIRIN SCH MG: 325 TABLET, DELAYED RELEASE ORAL at 12:07

## 2018-10-27 RX ADMIN — Medication SCH ML: at 13:43

## 2018-10-27 NOTE — PDOC PROGRESS REPORT
Subjective


Progress Note for:: 10/27/18


Subjective:: 





Doing better, swallowing okay.  Still with left-sided weakness.  No fever or 

chills, no chest pain or shortness of breath or palpitations.


Reason For Visit: 


CVA HTN DEMENTIA








Physical Exam


Vital Signs: 


 











Temp Pulse Resp BP Pulse Ox


 


 97.9 F   93   18   147/91 H  98 


 


 10/27/18 07:38  10/27/18 07:38  10/27/18 07:38  10/27/18 07:38  10/27/18 07:38








 Intake & Output











 10/26/18 10/27/18 10/28/18





 06:59 06:59 06:59


 


Intake Total 1000 390 


 


Output Total  1325 


 


Balance 1000 -935 


 


Weight 83.1 kg 83.4 kg 











General appearance: PRESENT: Appears comfortable, no acute distress.


Head exam: PRESENT: atraumatic, normocephalic


Neck exam: ABSENT: carotid bruit, JVD, tenderness


Respiratory exam: PRESENT: clear to auscultation gautam, symmetrical.  ABSENT: 

rales, rhonchi, wheezes


Cardiovascular exam: PRESENT: RRR, +S1, +S2, systolic murmur


Pulses: PRESENT: normal carotid pulses, normal radial pulses


GI/Abdominal exam: PRESENT: normal bowel sounds, nontender.


Rectal exam: PRESENT: deferred


Extremities exam: ABSENT: pedal edema


Neurological exam: PRESENT: awake, oriented to person.  Other - Patient 

exhibits dysarthria with left-sided hemiparesis.  The patient can lift his left 

leg off the bed and has 1+ dorsiflexion and plantar flexion of the foot.  He 

can move his left arm to admitted extent, able to  with it to admitted 

extent.  Right side exam was normal..  ABSENT: oriented to time, oriented to 

situation


Skin exam: PRESENT: dry, intact, warm.  ABSENT: cyanosis, rash








Results


Laboratory Results: 


 





 10/27/18 04:29 





 10/27/18 04:29 





 











  10/27/18 10/27/18





  04:29 04:29


 


WBC  10.2 


 


RBC  4.45 


 


Hgb  13.8 


 


Hct  39.8 


 


MCV  90 


 


MCH  31.0 


 


MCHC  34.5 


 


RDW  13.7 


 


Plt Count  196 


 


Seg Neutrophils %  63.4 


 


Lymphocytes %  27.2 


 


Monocytes %  8.0 


 


Eosinophils %  0.9 


 


Basophils %  0.5 


 


Absolute Neutrophils  6.4 


 


Absolute Lymphocytes  2.8 


 


Absolute Monocytes  0.8 


 


Absolute Eosinophils  0.1 


 


Absolute Basophils  0.0 


 


Sodium   142.7


 


Potassium   3.7


 


Chloride   106


 


Carbon Dioxide   26


 


Anion Gap   11


 


BUN   7


 


Creatinine   0.68


 


Est GFR ( Amer)   > 60


 


Est GFR (Non-Af Amer)   > 60


 


Glucose   120 H


 


Calcium   9.0


 


Total Bilirubin   2.0 H


 


AST   23


 


ALT   41


 


Alkaline Phosphatase   105


 


Total Protein   6.5


 


Albumin   3.6








 











  10/26/18 10/26/18 10/26/18





  04:02 04:02 10:40


 


Creatine Kinase   44 L  45 L


 


CK-MB (CK-2)  0.57  


 


Troponin I  < 0.012  














  10/26/18 10/26/18 10/26/18





  10:40 16:00 16:00


 


Creatine Kinase   55 


 


CK-MB (CK-2)  0.53   0.51


 


Troponin I  < 0.012   < 0.012











Impressions: 


 





Chest X-Ray  10/25/18 21:35


IMPRESSION: 


 


There is no acute pleural-parenchymal process seen in the imaged


lung fields.


 


Location of Interpretation: Teleradiology


 








Head CT  10/25/18 21:35


IMPRESSION: 


 


There is no acute intracranial abnormality.


 


Age related and chronic involutional changes are seen.


 








Head MRI  10/26/18 00:00


IMPRESSION:


1. RESTRICTED DIFFUSION IN THE RIGHT PARIETAL AND TEMPORAL LOBE CONSISTENT WITH 

ACUTE INFARCT.


2. SURGICAL CHANGES INVOLVING THE LEFT CEREBELLUM.


3. ATROPHY AND CHRONIC MICRO-VASCULAR ISCHEMIC CHANGES.


EVIDENCE OF ACUTE STROKE: YES.  RIGHT MCA.


 








Carotid Doppler Study  10/26/18 00:22


IMPRESSION:  NO HEMODYNAMICALLY SIGNIFICANT STENOSIS.


 














Assessment & Plan





- Diagnosis


(1) Acute embolic stroke


Is this a current diagnosis for this admission?: Yes   





(2) Dysphagia


Qualifiers: 


   Dysphagia type: unspecified   Qualified Code(s): R13.10 - Dysphagia, 

unspecified   


Is this a current diagnosis for this admission?: Yes   





(3) Hypertension


Is this a current diagnosis for this admission?: Yes   





(4) Left-sided weakness


Is this a current diagnosis for this admission?: Yes   





(5) Opiate dependence


Qualifiers: 


   Substance use status: uncomplicated   Qualified Code(s): F11.20 - Opioid 

dependence, uncomplicated   


Is this a current diagnosis for this admission?: Yes   





- Plan Summary


Plan Summary: 





For CVA, will change aspirin from per rectum to orally.  Continue aspirin, 

antihypertensive, and lipid medications.





PT/OT consult/evaluation.  Plan is likely for skilled nursing facility with 

rehab.





Continue management otherwise.

## 2018-10-28 LAB
ADD MANUAL DIFF: NO
ALBUMIN SERPL-MCNC: 3.7 G/DL (ref 3.5–5)
ALP SERPL-CCNC: 110 U/L (ref 38–126)
ALT SERPL-CCNC: 34 U/L (ref 21–72)
ANION GAP SERPL CALC-SCNC: 13 MMOL/L (ref 5–19)
AST SERPL-CCNC: 22 U/L (ref 17–59)
BASOPHILS # BLD AUTO: 0.1 10^3/UL (ref 0–0.2)
BASOPHILS NFR BLD AUTO: 0.6 % (ref 0–2)
BILIRUB DIRECT SERPL-MCNC: 0.4 MG/DL (ref 0–0.4)
BILIRUB SERPL-MCNC: 1.5 MG/DL (ref 0.2–1.3)
BUN SERPL-MCNC: 9 MG/DL (ref 7–20)
CALCIUM: 9.4 MG/DL (ref 8.4–10.2)
CHLORIDE SERPL-SCNC: 105 MMOL/L (ref 98–107)
CO2 SERPL-SCNC: 24 MMOL/L (ref 22–30)
EOSINOPHIL # BLD AUTO: 0.2 10^3/UL (ref 0–0.6)
EOSINOPHIL NFR BLD AUTO: 1.9 % (ref 0–6)
ERYTHROCYTE [DISTWIDTH] IN BLOOD BY AUTOMATED COUNT: 13.5 % (ref 11.5–14)
GLUCOSE SERPL-MCNC: 110 MG/DL (ref 75–110)
HCT VFR BLD CALC: 41.4 % (ref 37.9–51)
HGB BLD-MCNC: 14.4 G/DL (ref 13.5–17)
LYMPHOCYTES # BLD AUTO: 3.4 10^3/UL (ref 0.5–4.7)
LYMPHOCYTES NFR BLD AUTO: 32.3 % (ref 13–45)
MCH RBC QN AUTO: 31.1 PG (ref 27–33.4)
MCHC RBC AUTO-ENTMCNC: 34.8 G/DL (ref 32–36)
MCV RBC AUTO: 90 FL (ref 80–97)
MONOCYTES # BLD AUTO: 0.9 10^3/UL (ref 0.1–1.4)
MONOCYTES NFR BLD AUTO: 8.2 % (ref 3–13)
NEUTROPHILS # BLD AUTO: 6 10^3/UL (ref 1.7–8.2)
NEUTS SEG NFR BLD AUTO: 57 % (ref 42–78)
PLATELET # BLD: 193 10^3/UL (ref 150–450)
POTASSIUM SERPL-SCNC: 3.6 MMOL/L (ref 3.6–5)
PROT SERPL-MCNC: 6.7 G/DL (ref 6.3–8.2)
RBC # BLD AUTO: 4.63 10^6/UL (ref 4.35–5.55)
SODIUM SERPL-SCNC: 141.7 MMOL/L (ref 137–145)
TOTAL CELLS COUNTED % (AUTO): 100 %
WBC # BLD AUTO: 10.5 10^3/UL (ref 4–10.5)

## 2018-10-28 RX ADMIN — ATORVASTATIN CALCIUM SCH MG: 80 TABLET, FILM COATED ORAL at 21:20

## 2018-10-28 RX ADMIN — GABAPENTIN SCH MG: 300 CAPSULE ORAL at 05:04

## 2018-10-28 RX ADMIN — Medication SCH ML: at 21:29

## 2018-10-28 RX ADMIN — HEPARIN SODIUM SCH UNIT: 5000 INJECTION, SOLUTION INTRAVENOUS; SUBCUTANEOUS at 05:04

## 2018-10-28 RX ADMIN — MORPHINE SULFATE PRN MG: 10 INJECTION INTRAMUSCULAR; INTRAVENOUS; SUBCUTANEOUS at 11:03

## 2018-10-28 RX ADMIN — Medication SCH ML: at 05:04

## 2018-10-28 RX ADMIN — GABAPENTIN SCH MG: 300 CAPSULE ORAL at 13:34

## 2018-10-28 RX ADMIN — LORAZEPAM PRN MG: 2 INJECTION INTRAMUSCULAR; INTRAVENOUS at 00:45

## 2018-10-28 RX ADMIN — MORPHINE SULFATE PRN MG: 10 INJECTION INTRAMUSCULAR; INTRAVENOUS; SUBCUTANEOUS at 16:17

## 2018-10-28 RX ADMIN — CALCIUM CARBONATE (ANTACID) CHEW TAB 500 MG PRN MG: 500 CHEW TAB at 19:57

## 2018-10-28 RX ADMIN — DOCUSATE SODIUM PRN MG: 100 CAPSULE, LIQUID FILLED ORAL at 21:20

## 2018-10-28 RX ADMIN — HEPARIN SODIUM SCH UNIT: 5000 INJECTION, SOLUTION INTRAVENOUS; SUBCUTANEOUS at 21:20

## 2018-10-28 RX ADMIN — Medication SCH ML: at 13:38

## 2018-10-28 RX ADMIN — ASPIRIN SCH MG: 325 TABLET, DELAYED RELEASE ORAL at 10:18

## 2018-10-28 RX ADMIN — GABAPENTIN SCH MG: 300 CAPSULE ORAL at 21:20

## 2018-10-28 RX ADMIN — HEPARIN SODIUM SCH UNIT: 5000 INJECTION, SOLUTION INTRAVENOUS; SUBCUTANEOUS at 13:37

## 2018-10-28 NOTE — XCELERA REPORT
[address] [address]

                               Tel: [phone]

                               Fax: [phone]



                      Transthoracic Echocardiogram Report

_______________________________________________________________________________



Name: MACKENZIE LEZAMAJR

MRN: [id number]                           Age: 68 yrs

Gender: Male                              : 1950

Patient Status: Inpatient                 Patient Location: [address] [place], [place] [address]

Account #: [id number]

Study Date: 10/28/2018 12:19 PM

Accession #: [id number]

_______________________________________________________________________________



Height: 69 in        Weight: 192 lb        BSA: 2.0 m2

_______________________________________________________________________________

Procedure: A complete two-dimensional transthoracic echocardiogram was

performed (2D, M-mode, spectral and color flow Doppler). The study was

technically difficult with many images being suboptimal in quality.

Reason For Study: Acute CVA





Ordering Physician: MERY SEXTON

Performed By: Melissa Rowe



_______________________________________________________________________________



Interpretation Summary

The left ventricular ejection fraction is preserved.

There is mild to moderate concentric left ventricular hypertrophy.

The left ventricle is grossly normal size.

LV diastolic function not assessed.

Regional wall motion abnormalities cannot be excluded due to limited

visualization.

Right ventricular function cannot be assessed due to poor image quality.

The right atrium is normal in size

The left atrial size is normal.

There is a trace amount of mitral regurgitation

There is no mitral valve stenosis.

No aortic regurgitation is present.

There is no aortic valve stenosis

No tricuspid regurgitation.

There is no tricuspid stenosis.

The aortic root is not well visualized.

The inferior vena cava was not visualized

There is no pericardial effusion.

Consider SAMEER if clinically indicated.

May consider mobile cardiac telemetry monitoring (MCT) for ruling out

transient AFIB.



MMode/2D Measurements & Calculations

RVDd: 2.2 cm        LVIDd: 3.8 cm FS: 28.1 %          Ao root diam: 3.8 cm

IVSd: 1.7 cm        LVIDs: 2.7 cm EDV(Teich): 62.6 ml

                                                      Ao root area: 11.5 cm2

                    LVPWd: 1.7 cm ESV(Teich): 28.1 ml LA dimension: 3.3 cm

                                  EF(Teich): 55.2 %

        _______________________________________________________________

LVOT diam: 2.6 cm

LVOT area: 5.4 cm2





Doppler Measurements & Calculations

MV E max weston:         MV dec time:     Ao V2 max:          LV V1 max P.3 cm/sec           0.14 sec         85.4 cm/sec         1.9 mmHg

                                       Ao max P.9 mmHg LV V1 max:

                                       BERONICA(V,D): 4.3 cm2   68.6 cm/sec

        _______________________________________________________________



PA V2 max:

55.5 cm/sec

PA max P.2 mmHg



Left Ventricle

The left ventricle is grossly normal size. There is mild to moderate

concentric left ventricular hypertrophy. The left ventricular ejection

fraction is preserved. LV diastolic function not assessed. Regional wall

motion abnormalities cannot be excluded due to limited visualization.



Right Ventricle

The right ventricle is not well visualized secondary to technical limitations.

Right ventricular function cannot be assessed due to poor image quality.



Atria

The right atrium is normal in size. The left atrial size is normal.

Interarterial septum not well visualized and not well dopplered. Cannot

comment on ASD/PFO presence.





Mitral Valve

The mitral valve is grossly normal. There is no mitral valve stenosis. There

is a trace amount of mitral regurgitation.



Aortic Valve

The aortic valve is not well visualized secondary to technical limitations.

There is no aortic valve stenosis. No aortic regurgitation is present.



Tricuspid Valve

The tricuspid valve is not well visualized secondary to technical limitations.

There is no tricuspid stenosis. No tricuspid regurgitation.



Pulmonic Valve

The pulmonic valve is not well visualized.





Great Vessels

The aortic root is not well visualized. The inferior vena cava was not

visualized.



Effusions

There is no pericardial effusion.



Incidental Findings

Consider SAMEER if clinically indicated. May consider mobile cardiac telemetry

monitoring (MCT) for ruling out transient AFIB.





_______________________________________________________________________________

_______________________________________________________________________________

Electronically signed by:      Jeff Salmeron      on 10/28/2018 01:12 PM



CC: MERY SEXTON

>

Jeff Salmeron

## 2018-10-28 NOTE — PDOC PROGRESS REPORT
Subjective


Progress Note for:: 10/28/18


Subjective:: 





Doing better, swallowing without problems or chocking.  Still with left-sided 

weakness.  No fever or chills, no chest pain or shortness of breath or 

palpitations.


Reason For Visit: 


CVA HTN DEMENTIA








Physical Exam


Vital Signs: 


 











Temp Pulse Resp BP Pulse Ox


 


 97.8 F   86   21 H  141/83 H  96 


 


 10/28/18 07:37  10/28/18 07:37  10/28/18 07:37  10/28/18 07:37  10/28/18 07:37








 Intake & Output











 10/27/18 10/28/18 10/29/18





 06:59 06:59 06:59


 


Intake Total 390 917 


 


Output Total 1325 2625 


 


Balance -935 -1708 


 


Weight 83.4 kg 87.5 kg 














General appearance: PRESENT: Appears comfortable, no acute distress.


Head exam: PRESENT: atraumatic, normocephalic


Neck exam: ABSENT: carotid bruit, JVD, tenderness


Respiratory exam: PRESENT: clear to auscultation gautam, symmetrical.  ABSENT: 

rales, rhonchi, wheezes


Cardiovascular exam: PRESENT: RRR, +S1, +S2, systolic murmur


Pulses: PRESENT: normal carotid pulses, normal radial pulses


GI/Abdominal exam: PRESENT: normal bowel sounds, nontender.


Rectal exam: PRESENT: deferred


Extremities exam: ABSENT: pedal edema


Neurological exam: PRESENT: awake, oriented to person.  Other - Patient 

exhibits dysarthria with left-sided hemiparesis.  The patient can lift his left 

leg off the bed and has 1+ dorsiflexion and plantar flexion of the foot.  He 

can move his left arm to admitted extent, able to  with it to admitted 

extent.  Right side exam was normal..  ABSENT: oriented to time, oriented to 

situation


Skin exam: PRESENT: dry, intact, warm.  ABSENT: cyanosis, rash








Results


Laboratory Results: 


 





 10/28/18 04:20 





 10/28/18 04:20 





 











  10/28/18 10/28/18





  04:20 04:20


 


WBC  10.5 


 


RBC  4.63 


 


Hgb  14.4 


 


Hct  41.4 


 


MCV  90 


 


MCH  31.1 


 


MCHC  34.8 


 


RDW  13.5 


 


Plt Count  193 


 


Seg Neutrophils %  57.0 


 


Lymphocytes %  32.3 


 


Monocytes %  8.2 


 


Eosinophils %  1.9 


 


Basophils %  0.6 


 


Absolute Neutrophils  6.0 


 


Absolute Lymphocytes  3.4 


 


Absolute Monocytes  0.9 


 


Absolute Eosinophils  0.2 


 


Absolute Basophils  0.1 


 


Sodium   141.7


 


Potassium   3.6


 


Chloride   105


 


Carbon Dioxide   24


 


Anion Gap   13


 


BUN   9


 


Creatinine   0.65


 


Est GFR ( Amer)   > 60


 


Est GFR (Non-Af Amer)   > 60


 


Glucose   110


 


Calcium   9.4


 


Total Bilirubin   1.5 H


 


AST   22


 


ALT   34


 


Alkaline Phosphatase   110


 


Total Protein   6.7


 


Albumin   3.7








 











  10/26/18 10/26/18 10/26/18





  04:02 04:02 10:40


 


Creatine Kinase   44 L  45 L


 


CK-MB (CK-2)  0.57  


 


Troponin I  < 0.012  














  10/26/18 10/26/18 10/26/18





  10:40 16:00 16:00


 


Creatine Kinase   55 


 


CK-MB (CK-2)  0.53   0.51


 


Troponin I  < 0.012   < 0.012











Impressions: 


 





Chest X-Ray  10/25/18 21:35


IMPRESSION: 


 


There is no acute pleural-parenchymal process seen in the imaged


lung fields.


 


Location of Interpretation: Teleradiology


 








Head CT  10/25/18 21:35


IMPRESSION: 


 


There is no acute intracranial abnormality.


 


Age related and chronic involutional changes are seen.


 








Head MRI  10/26/18 00:00


IMPRESSION:


1. RESTRICTED DIFFUSION IN THE RIGHT PARIETAL AND TEMPORAL LOBE CONSISTENT WITH 

ACUTE INFARCT.


2. SURGICAL CHANGES INVOLVING THE LEFT CEREBELLUM.


3. ATROPHY AND CHRONIC MICRO-VASCULAR ISCHEMIC CHANGES.


EVIDENCE OF ACUTE STROKE: YES.  RIGHT MCA.


 








Carotid Doppler Study  10/26/18 00:22


IMPRESSION:  NO HEMODYNAMICALLY SIGNIFICANT STENOSIS.


 














Assessment & Plan





- Diagnosis


(1) Acute embolic stroke


Is this a current diagnosis for this admission?: Yes   





(2) Dysphagia


Qualifiers: 


   Dysphagia type: unspecified   Qualified Code(s): R13.10 - Dysphagia, 

unspecified   


Is this a current diagnosis for this admission?: Yes   





(3) Hypertension


Is this a current diagnosis for this admission?: Yes   





(4) Left-sided weakness


Is this a current diagnosis for this admission?: Yes   





(5) Opiate dependence


Qualifiers: 


   Substance use status: uncomplicated   Qualified Code(s): F11.20 - Opioid 

dependence, uncomplicated   


Is this a current diagnosis for this admission?: Yes   





- Plan Summary


Plan Summary: 





For CVA, 


-carotid Dopplers negative.  It appears echo has not been done, will order one.

  


-Will continue aspirin 325mg orally daily.  


-Continue antihypertensive, lipid medications.





PT/OT evaluation appreciated.  Recommending rehab.  Will have patient evaluated 

for this.  





Continue management otherwise.

## 2018-10-29 LAB
ALBUMIN SERPL-MCNC: 3.9 G/DL (ref 3.5–5)
ALP SERPL-CCNC: 110 U/L (ref 38–126)
ALT SERPL-CCNC: 25 U/L (ref 21–72)
ANION GAP SERPL CALC-SCNC: 15 MMOL/L (ref 5–19)
AST SERPL-CCNC: 20 U/L (ref 17–59)
BILIRUB DIRECT SERPL-MCNC: 0.2 MG/DL (ref 0–0.4)
BILIRUB SERPL-MCNC: 0.9 MG/DL (ref 0.2–1.3)
BUN SERPL-MCNC: 10 MG/DL (ref 7–20)
CALCIUM: 10 MG/DL (ref 8.4–10.2)
CHLORIDE SERPL-SCNC: 106 MMOL/L (ref 98–107)
CO2 SERPL-SCNC: 23 MMOL/L (ref 22–30)
GLUCOSE SERPL-MCNC: 114 MG/DL (ref 75–110)
POTASSIUM SERPL-SCNC: 3.8 MMOL/L (ref 3.6–5)
PROT SERPL-MCNC: 6.7 G/DL (ref 6.3–8.2)
SODIUM SERPL-SCNC: 144.2 MMOL/L (ref 137–145)

## 2018-10-29 RX ADMIN — ASPIRIN SCH MG: 325 TABLET, DELAYED RELEASE ORAL at 10:00

## 2018-10-29 RX ADMIN — Medication SCH ML: at 13:39

## 2018-10-29 RX ADMIN — DIPHENHYDRAMINE HYDROCHLORIDE PRN MG: 25 CAPSULE ORAL at 22:19

## 2018-10-29 RX ADMIN — GABAPENTIN SCH MG: 300 CAPSULE ORAL at 05:37

## 2018-10-29 RX ADMIN — Medication SCH ML: at 05:37

## 2018-10-29 RX ADMIN — HEPARIN SODIUM SCH UNIT: 5000 INJECTION, SOLUTION INTRAVENOUS; SUBCUTANEOUS at 05:37

## 2018-10-29 RX ADMIN — GUAIFENESIN PRN MG: 200 SOLUTION ORAL at 11:55

## 2018-10-29 RX ADMIN — LORAZEPAM PRN MG: 2 INJECTION INTRAMUSCULAR; INTRAVENOUS at 00:14

## 2018-10-29 RX ADMIN — HEPARIN SODIUM SCH UNIT: 5000 INJECTION, SOLUTION INTRAVENOUS; SUBCUTANEOUS at 22:20

## 2018-10-29 RX ADMIN — MORPHINE SULFATE PRN MG: 10 INJECTION INTRAMUSCULAR; INTRAVENOUS; SUBCUTANEOUS at 08:48

## 2018-10-29 RX ADMIN — MORPHINE SULFATE PRN MG: 10 INJECTION INTRAMUSCULAR; INTRAVENOUS; SUBCUTANEOUS at 01:25

## 2018-10-29 RX ADMIN — ATORVASTATIN CALCIUM SCH MG: 80 TABLET, FILM COATED ORAL at 22:20

## 2018-10-29 RX ADMIN — GABAPENTIN SCH MG: 300 CAPSULE ORAL at 13:38

## 2018-10-29 RX ADMIN — HEPARIN SODIUM SCH UNIT: 5000 INJECTION, SOLUTION INTRAVENOUS; SUBCUTANEOUS at 13:39

## 2018-10-29 RX ADMIN — GUAIFENESIN PRN MG: 200 SOLUTION ORAL at 17:40

## 2018-10-29 RX ADMIN — Medication SCH ML: at 22:20

## 2018-10-29 RX ADMIN — MORPHINE SULFATE PRN MG: 10 INJECTION INTRAMUSCULAR; INTRAVENOUS; SUBCUTANEOUS at 22:19

## 2018-10-29 RX ADMIN — MORPHINE SULFATE PRN MG: 10 INJECTION INTRAMUSCULAR; INTRAVENOUS; SUBCUTANEOUS at 17:37

## 2018-10-29 RX ADMIN — GABAPENTIN SCH MG: 300 CAPSULE ORAL at 22:20

## 2018-10-29 RX ADMIN — MORPHINE SULFATE PRN MG: 10 INJECTION INTRAMUSCULAR; INTRAVENOUS; SUBCUTANEOUS at 13:38

## 2018-10-29 NOTE — PDOC PROGRESS REPORT
Subjective


Progress Note for:: 10/29/18


Subjective:: 





History 8-year-old male admitted with CVA.  Doing better, no problems of 

choking with swallowing.  Still with left-sided weakness.  No fever or chills, 

no chest pain or shortness of breath or palpitations.


Reason For Visit: 


CVA HTN DEMENTIA








Physical Exam


Vital Signs: 


 











Temp Pulse Resp BP Pulse Ox


 


 97.9 F   103 H  18   136/89 H  97 


 


 10/29/18 15:07  10/29/18 15:07  10/29/18 15:07  10/29/18 15:07  10/29/18 15:07








 Intake & Output











 10/28/18 10/29/18 10/30/18





 06:59 06:59 06:59


 


Intake Total 917 1356 480


 


Output Total 2625 1700 200


 


Balance -1708 -344 280


 


Weight 87.5 kg 86.3 kg 








General appearance: PRESENT: Appears comfortable, no acute distress.


Head exam: PRESENT: atraumatic, normocephalic


Neck exam: ABSENT: carotid bruit, JVD, tenderness


Respiratory exam: PRESENT: clear to auscultation gautam, symmetrical.  ABSENT: 

rales, rhonchi, wheezes


Cardiovascular exam: PRESENT: RRR, +S1, +S2, systolic murmur


Pulses: PRESENT: normal carotid pulses, normal radial pulses


GI/Abdominal exam: PRESENT: normal bowel sounds, nontender.


Rectal exam: PRESENT: deferred


Extremities exam: ABSENT: pedal edema


Neurological exam: Patient is alert and oriented.  PRESENT: awake, oriented to 

person.  Other - Patient speech better but still with left-sided hemiparesis.  

The patient can lift his left leg off the bed to a minimal extent and has 1+ 

dorsiflexion and plantar flexion of the foot.  He can his left arm to with 

effort but slowly improving, able to  with it.  Right side exam  normal..  


Skin exam: PRESENT: dry, intact, warm.  ABSENT: cyanosis, rash








Results


Laboratory Results: 


 





 10/28/18 04:20 





 10/29/18 05:38 





 











  10/29/18





  05:38


 


Sodium  144.2


 


Potassium  3.8


 


Chloride  106


 


Carbon Dioxide  23


 


Anion Gap  15


 


BUN  10


 


Creatinine  0.63


 


Est GFR ( Amer)  > 60


 


Est GFR (Non-Af Amer)  > 60


 


Glucose  114 H


 


Calcium  10.0


 


Total Bilirubin  0.9


 


AST  20


 


ALT  25


 


Alkaline Phosphatase  110


 


Total Protein  6.7


 


Albumin  3.9








 











  10/26/18 10/26/18 10/26/18





  04:02 04:02 10:40


 


Creatine Kinase   44 L  45 L


 


CK-MB (CK-2)  0.57  


 


Troponin I  < 0.012  














  10/26/18 10/26/18 10/26/18





  10:40 16:00 16:00


 


Creatine Kinase   55 


 


CK-MB (CK-2)  0.53   0.51


 


Troponin I  < 0.012   < 0.012











Impressions: 


 





Chest X-Ray  10/25/18 21:35


IMPRESSION: 


 


There is no acute pleural-parenchymal process seen in the imaged


lung fields.


 


Location of Interpretation: Teleradiology


 








Head CT  10/25/18 21:35


IMPRESSION: 


 


There is no acute intracranial abnormality.


 


Age related and chronic involutional changes are seen.


 








Head MRI  10/26/18 00:00


IMPRESSION:


1. RESTRICTED DIFFUSION IN THE RIGHT PARIETAL AND TEMPORAL LOBE CONSISTENT WITH 

ACUTE INFARCT.


2. SURGICAL CHANGES INVOLVING THE LEFT CEREBELLUM.


3. ATROPHY AND CHRONIC MICRO-VASCULAR ISCHEMIC CHANGES.


EVIDENCE OF ACUTE STROKE: YES.  RIGHT MCA.


 








Carotid Doppler Study  10/26/18 00:22


IMPRESSION:  NO HEMODYNAMICALLY SIGNIFICANT STENOSIS.


 














Assessment & Plan





- Diagnosis


(1) Acute embolic stroke


Is this a current diagnosis for this admission?: Yes   





(2) Dysphagia


Qualifiers: 


   Dysphagia type: unspecified   Qualified Code(s): R13.10 - Dysphagia, 

unspecified   


Is this a current diagnosis for this admission?: Yes   





(3) Hypertension


Is this a current diagnosis for this admission?: Yes   





(4) Left-sided weakness


Is this a current diagnosis for this admission?: Yes   





(5) Opiate dependence


Qualifiers: 


   Substance use status: uncomplicated   Qualified Code(s): F11.20 - Opioid 

dependence, uncomplicated   


Is this a current diagnosis for this admission?: Yes   





- Plan Summary


Plan Summary: 





For CVA, 


-carotid Dopplers negative, echo with preserved EF, no gross abnormality.  


-Will continue aspirin 325mg orally daily.  


-Continue antihypertensive, lipid medications.





PT/OT evaluated the patient.  Recommended rehab.  Care management working on 

this.  





Continue management otherwise.

## 2018-10-30 RX ADMIN — ATORVASTATIN CALCIUM SCH MG: 80 TABLET, FILM COATED ORAL at 21:42

## 2018-10-30 RX ADMIN — GABAPENTIN SCH MG: 300 CAPSULE ORAL at 06:24

## 2018-10-30 RX ADMIN — TRAZODONE HYDROCHLORIDE SCH MG: 50 TABLET ORAL at 21:42

## 2018-10-30 RX ADMIN — DIPHENHYDRAMINE HYDROCHLORIDE PRN MG: 25 CAPSULE ORAL at 10:34

## 2018-10-30 RX ADMIN — GUAIFENESIN PRN MG: 200 SOLUTION ORAL at 00:31

## 2018-10-30 RX ADMIN — MORPHINE SULFATE SCH MG: 15 TABLET, EXTENDED RELEASE ORAL at 21:42

## 2018-10-30 RX ADMIN — MORPHINE SULFATE PRN MG: 10 INJECTION INTRAMUSCULAR; INTRAVENOUS; SUBCUTANEOUS at 08:02

## 2018-10-30 RX ADMIN — DULOXETINE SCH MG: 30 CAPSULE, DELAYED RELEASE ORAL at 13:04

## 2018-10-30 RX ADMIN — GUAIFENESIN PRN MG: 200 SOLUTION ORAL at 10:35

## 2018-10-30 RX ADMIN — MORPHINE SULFATE SCH MG: 15 TABLET, EXTENDED RELEASE ORAL at 13:04

## 2018-10-30 RX ADMIN — LORAZEPAM PRN MG: 2 INJECTION INTRAMUSCULAR; INTRAVENOUS at 18:18

## 2018-10-30 RX ADMIN — GABAPENTIN SCH MG: 300 CAPSULE ORAL at 21:42

## 2018-10-30 RX ADMIN — ASPIRIN SCH MG: 325 TABLET, DELAYED RELEASE ORAL at 10:34

## 2018-10-30 RX ADMIN — GUAIFENESIN PRN MG: 200 SOLUTION ORAL at 18:18

## 2018-10-30 RX ADMIN — APIXABAN SCH MG: 5 TABLET, FILM COATED ORAL at 13:05

## 2018-10-30 RX ADMIN — CALCIUM CARBONATE (ANTACID) CHEW TAB 500 MG PRN MG: 500 CHEW TAB at 12:34

## 2018-10-30 RX ADMIN — Medication SCH ML: at 21:49

## 2018-10-30 RX ADMIN — HEPARIN SODIUM SCH UNIT: 5000 INJECTION, SOLUTION INTRAVENOUS; SUBCUTANEOUS at 06:25

## 2018-10-30 RX ADMIN — GABAPENTIN SCH MG: 300 CAPSULE ORAL at 13:05

## 2018-10-30 RX ADMIN — MORPHINE SULFATE PRN MG: 10 INJECTION INTRAMUSCULAR; INTRAVENOUS; SUBCUTANEOUS at 16:57

## 2018-10-30 RX ADMIN — APIXABAN SCH MG: 5 TABLET, FILM COATED ORAL at 17:01

## 2018-10-30 RX ADMIN — LORAZEPAM PRN MG: 2 INJECTION INTRAMUSCULAR; INTRAVENOUS at 00:46

## 2018-10-30 RX ADMIN — Medication SCH ML: at 06:25

## 2018-10-30 RX ADMIN — MORPHINE SULFATE PRN MG: 10 INJECTION INTRAMUSCULAR; INTRAVENOUS; SUBCUTANEOUS at 12:07

## 2018-10-30 RX ADMIN — METOPROLOL SUCCINATE SCH MG: 50 TABLET, EXTENDED RELEASE ORAL at 13:03

## 2018-10-30 RX ADMIN — Medication SCH ML: at 13:20

## 2018-10-30 NOTE — PDOC PROGRESS REPORT
Subjective


Progress Note for:: 10/30/18


Subjective:: 





10/26/18:


MACKENZIE MCDONALD JR is a 68 year old male who presented to the ER after being 

found down at his home.  He was found by his son on the floor with dysarthria, 

left-sided weakness and cierra-neglect with persistence.  He has no history of CVA

, atrial fibrillation or palpitations.   Initial CT imaging was unremarkable.  

He was out of the window for TPA interventional therapy and failed a swallow 

trial in the ER. He  received [place] aspirin and was admitted to the hospitalist 

service.





10/30/18:


Since his admission Mr. Mcdonald has been treated with physical therapy, 

occupational therapy and speech therapy.  His hypertension is been well 

controlled and he has been medically stable.  He was noted to have a run of 

atrial fibrillation with rapid ventricular response lasting for apparently 

several minutes about the time of my visit.  I noted the abnormal rhythm on 

auscultation chest just as I was approached by his nurse with a rhythm strip 

from his telemetry monitor.  Patient was asymptomatic with this change.  He was 

evaluated by the acute rehab facility today however they have determined that 

due to his dementia he will probably need to go to a skilled nursing facility 

for rehab.  Patient's medications will be adjusted to control his rapid 

ventricular response rate.  This will be done utilizing metoprolol succinate as 

the patient is also mildly hypertensive.


Reason For Visit: 


CVA HTN DEMENTIA








Physical Exam


Vital Signs: 


 











Temp Pulse Resp BP Pulse Ox


 


 97.3 F   93   16   127/88 H  99 


 


 10/30/18 11:18  10/30/18 14:00  10/30/18 11:18  10/30/18 11:18  10/30/18 11:18








 Intake & Output











 10/28/18 10/29/18 10/30/18





 23:59 23:59 23:59


 


Intake Total 1371 942 358


 


Output Total 1727 0745 885


 


Balance -530 -784 -609


 


Weight 87.5 kg 86.3 kg 86.3 kg











General appearance: PRESENT: no acute distress, cooperative, other - Somewhat 

robotic in his actions and very flat in his affect.


Head exam: PRESENT: atraumatic, normocephalic


Eye exam: PRESENT: conjunctiva pink.  ABSENT: nystagmus, scleral icterus


Ear exam: PRESENT: normal external ear exam.  ABSENT: bleeding, drainage


Mouth exam: PRESENT: neck supple, tongue midline


Neck exam: ABSENT: thyromegaly, tracheal deviation


Respiratory exam: PRESENT: clear to auscultation gautam, symmetrical, unlabored


Cardiovascular exam: PRESENT: irregular rhythm, tachycardia.  ABSENT: clicks, 

diastolic murmur, gallop, rubs, systolic murmur


Vascular exam: PRESENT: normal capillary refill.  ABSENT: pallor


GI/Abdominal exam: PRESENT: normal bowel sounds, soft


Rectal exam: PRESENT: deferred


Extremities exam: ABSENT: joint swelling, pedal edema


Musculoskeletal exam: ABSENT: deformity, dislocation


Neurological exam: PRESENT: alert, oriented to person.  ABSENT: oriented to 

place, oriented to time, oriented to situation


Psychiatric exam: PRESENT: flat affect, normal mood


Skin exam: ABSENT: jaundice, rash, urticaria





Results


Laboratory Results: 


 





 10/28/18 04:20 





 10/29/18 05:38 





 











  10/26/18 10/26/18 10/26/18





  04:02 04:02 10:40


 


Creatine Kinase   44 L  45 L


 


CK-MB (CK-2)  0.57  


 


Troponin I  < 0.012  














  10/26/18 10/26/18 10/26/18





  10:40 16:00 16:00


 


Creatine Kinase   55 


 


CK-MB (CK-2)  0.53   0.51


 


Troponin I  < 0.012   < 0.012











Impressions: 


 





Chest X-Ray  10/25/18 21:35


IMPRESSION: 


 


There is no acute pleural-parenchymal process seen in the imaged


lung fields.


 


Location of Interpretation: Teleradiology


 








Head CT  10/25/18 21:35


IMPRESSION: 


 


There is no acute intracranial abnormality.


 


Age related and chronic involutional changes are seen.


 








Head MRI  10/26/18 00:00


IMPRESSION:


1. RESTRICTED DIFFUSION IN THE RIGHT PARIETAL AND TEMPORAL LOBE CONSISTENT WITH 

ACUTE INFARCT.


2. SURGICAL CHANGES INVOLVING THE LEFT CEREBELLUM.


3. ATROPHY AND CHRONIC MICRO-VASCULAR ISCHEMIC CHANGES.


EVIDENCE OF ACUTE STROKE: YES.  RIGHT MCA.


 








Carotid Doppler Study  10/26/18 00:22


IMPRESSION:  NO HEMODYNAMICALLY SIGNIFICANT STENOSIS.


 














Assessment & Plan





- Diagnosis


(1) Acute embolic stroke


Is this a current diagnosis for this admission?: Yes   


Plan: 


Patient will be continued on the current comprehensive post ischemic stroke 

treatment program with our stroke team here in the hospital and with their 

ongoing support during his post hospital rehabilitation.  He will be maintained 

on low-dose aspirin therapy and he will also be on apixaban which will be used 

to help her to prevent any future embolic strokes due to his paroxysmal atrial 

fibrillation.








(2) Paroxysmal atrial fibrillation with rapid ventricular response


Is this a current diagnosis for this admission?: Yes   


Plan: 


The diagnosis was made today by telemetry recording.  Patient will be started 

on apixaban 5 mg p.o. twice daily as well as metoprolol succinate 200 mg p.o. 

daily for control of his rapid ventricular response and management to prevent 

embolic stroke.








(3) Left-sided neglect


Is this a current diagnosis for this admission?: Yes   


Plan: 


Patient will continue in occupational therapy for ongoing treatment and of his 

left-sided neglect with persistence.








(4) Left-sided weakness


Is this a current diagnosis for this admission?: Yes   


Plan: 


Patient will be continued with physical therapy for ongoing treatment of his 

left-sided weakness and stroke related physical issues.








(5) Dysphagia


Qualifiers: 


   Dysphagia type: unspecified   Qualified Code(s): R13.10 - Dysphagia, 

unspecified   


Is this a current diagnosis for this admission?: Yes   


Plan: 


Patient will be continued with speech therapy for treatment of his dysphagia.








(6) Hypertension


Qualifiers: 


   Hypertension type: essential hypertension   Qualified Code(s): I10 - 

Essential (primary) hypertension   


Is this a current diagnosis for this admission?: Yes   


Plan: 


We will control the patient's blood pressure utilizing metoprolol succinate.  

Ongoing antihypertensive therapy will need reassessment and adjustment as 

appropriate in his posthospital rehabilitative course.








(7) Opiate dependence


Qualifiers: 


   Substance use status: uncomplicated   Qualified Code(s): F11.20 - Opioid 

dependence, uncomplicated   


Is this a current diagnosis for this admission?: Yes   


Plan: 


We will plan to try to decrease the patient's opiate use with gradual reduction 

in opiates provided.  This will need continue during the patient's post 

hospital rehabilitation course.








- Time


Time Spent with patient: 25-34 minutes

## 2018-10-31 LAB
ADD MANUAL DIFF: NO
ALBUMIN SERPL-MCNC: 3.8 G/DL (ref 3.5–5)
ALP SERPL-CCNC: 97 U/L (ref 38–126)
ALT SERPL-CCNC: 27 U/L (ref 21–72)
ANION GAP SERPL CALC-SCNC: 13 MMOL/L (ref 5–19)
AST SERPL-CCNC: 26 U/L (ref 17–59)
BASOPHILS # BLD AUTO: 0.1 10^3/UL (ref 0–0.2)
BASOPHILS NFR BLD AUTO: 0.5 % (ref 0–2)
BILIRUB DIRECT SERPL-MCNC: 0.3 MG/DL (ref 0–0.4)
BILIRUB SERPL-MCNC: 1.1 MG/DL (ref 0.2–1.3)
BUN SERPL-MCNC: 18 MG/DL (ref 7–20)
CALCIUM: 9.5 MG/DL (ref 8.4–10.2)
CHLORIDE SERPL-SCNC: 103 MMOL/L (ref 98–107)
CO2 SERPL-SCNC: 28 MMOL/L (ref 22–30)
EOSINOPHIL # BLD AUTO: 0.5 10^3/UL (ref 0–0.6)
EOSINOPHIL NFR BLD AUTO: 4.2 % (ref 0–6)
ERYTHROCYTE [DISTWIDTH] IN BLOOD BY AUTOMATED COUNT: 13.3 % (ref 11.5–14)
GLUCOSE SERPL-MCNC: 130 MG/DL (ref 75–110)
HCT VFR BLD CALC: 43.5 % (ref 37.9–51)
HGB BLD-MCNC: 14.7 G/DL (ref 13.5–17)
LYMPHOCYTES # BLD AUTO: 3.2 10^3/UL (ref 0.5–4.7)
LYMPHOCYTES NFR BLD AUTO: 25.7 % (ref 13–45)
MCH RBC QN AUTO: 30.9 PG (ref 27–33.4)
MCHC RBC AUTO-ENTMCNC: 33.9 G/DL (ref 32–36)
MCV RBC AUTO: 91 FL (ref 80–97)
MONOCYTES # BLD AUTO: 1.1 10^3/UL (ref 0.1–1.4)
MONOCYTES NFR BLD AUTO: 8.6 % (ref 3–13)
NEUTROPHILS # BLD AUTO: 7.5 10^3/UL (ref 1.7–8.2)
NEUTS SEG NFR BLD AUTO: 61 % (ref 42–78)
PLATELET # BLD: 249 10^3/UL (ref 150–450)
POTASSIUM SERPL-SCNC: 3.8 MMOL/L (ref 3.6–5)
PROT SERPL-MCNC: 6.6 G/DL (ref 6.3–8.2)
RBC # BLD AUTO: 4.78 10^6/UL (ref 4.35–5.55)
SODIUM SERPL-SCNC: 143.7 MMOL/L (ref 137–145)
TOTAL CELLS COUNTED % (AUTO): 100 %
WBC # BLD AUTO: 12.4 10^3/UL (ref 4–10.5)

## 2018-10-31 RX ADMIN — METOPROLOL SUCCINATE SCH MG: 50 TABLET, EXTENDED RELEASE ORAL at 10:14

## 2018-10-31 RX ADMIN — APIXABAN SCH MG: 5 TABLET, FILM COATED ORAL at 10:13

## 2018-10-31 RX ADMIN — DULOXETINE SCH MG: 30 CAPSULE, DELAYED RELEASE ORAL at 10:13

## 2018-10-31 RX ADMIN — ASPIRIN SCH MG: 81 TABLET, CHEWABLE ORAL at 10:13

## 2018-10-31 RX ADMIN — LORAZEPAM PRN MG: 2 INJECTION INTRAMUSCULAR; INTRAVENOUS at 23:06

## 2018-10-31 RX ADMIN — MORPHINE SULFATE SCH MG: 15 TABLET, EXTENDED RELEASE ORAL at 13:56

## 2018-10-31 RX ADMIN — GABAPENTIN SCH MG: 300 CAPSULE ORAL at 13:56

## 2018-10-31 RX ADMIN — APIXABAN SCH MG: 5 TABLET, FILM COATED ORAL at 18:01

## 2018-10-31 RX ADMIN — Medication SCH ML: at 05:44

## 2018-10-31 RX ADMIN — Medication SCH ML: at 13:59

## 2018-10-31 RX ADMIN — DIPHENHYDRAMINE HYDROCHLORIDE PRN MG: 25 CAPSULE ORAL at 01:41

## 2018-10-31 RX ADMIN — MORPHINE SULFATE SCH MG: 15 TABLET, EXTENDED RELEASE ORAL at 05:12

## 2018-10-31 RX ADMIN — Medication SCH ML: at 21:46

## 2018-10-31 RX ADMIN — GABAPENTIN SCH MG: 300 CAPSULE ORAL at 05:13

## 2018-10-31 RX ADMIN — LORAZEPAM PRN MG: 2 INJECTION INTRAMUSCULAR; INTRAVENOUS at 04:35

## 2018-10-31 RX ADMIN — TRAZODONE HYDROCHLORIDE SCH MG: 50 TABLET ORAL at 21:46

## 2018-10-31 RX ADMIN — ATORVASTATIN CALCIUM SCH MG: 80 TABLET, FILM COATED ORAL at 21:46

## 2018-10-31 RX ADMIN — MORPHINE SULFATE SCH MG: 15 TABLET, EXTENDED RELEASE ORAL at 21:45

## 2018-10-31 RX ADMIN — GABAPENTIN SCH MG: 300 CAPSULE ORAL at 21:44

## 2018-10-31 NOTE — PDOC PROGRESS REPORT
Subjective


Progress Note for:: 10/31/18


Subjective:: 





10/26/18:


MACKENZIE MCDONALD JR is a 68 year old male who presented to the ER after being 

found down at his home.  He was found by his son on the floor with dysarthria, 

left-sided weakness and cierra-neglect with persistence.  He has no history of CVA

, atrial fibrillation or palpitations.   Initial CT imaging was unremarkable.  

He was out of the window for TPA interventional therapy and failed a swallow 

trial in the ER. He  received [place] aspirin and was admitted to the hospitalist 

service.





10/30/18:


Since his admission Mr. Mcdonald has been treated with physical therapy, 

occupational therapy and speech therapy.  His hypertension is been well 

controlled and he has been medically stable.  He was noted to have a run of 

atrial fibrillation with rapid ventricular response lasting for apparently 

several minutes about the time of my visit.  I noted the abnormal rhythm on 

auscultation chest just as I was approached by his nurse with a rhythm strip 

from his telemetry monitor.  Patient was asymptomatic with this change.  He was 

evaluated by the acute rehab facility today however they have determined that 

due to his dementia he will probably need to go to a skilled nursing facility 

for rehab.  Patient's medications will be adjusted to control his rapid 

ventricular response rate.  This will be done utilizing metoprolol succinate as 

the patient is also mildly hypertensive.





10/31/18:


Mr. Mcdonald continues to do well with physical therapy, occupational therapy 

and speech therapy.  He was noted to have a mild episode of confusion earlier 

today but this seems to have resolved.  His blood pressure continues to be well 

controlled and he is not been noted to have any further rapid ventricular 

response to his paroxysmal occurrences of atrial fibrillation.  He states that 

he is getting by okay and denies any acute pain or problems.  He has been able 

to eat and drink without recurrence of nausea or vomiting.  He continues to 

have inattention to the left side with very mild left-sided weakness.  His 

examination is essentially unchanged since yesterday.  We are currently waiting 

for a skilled nursing facility bed for the patient to be transferred for 

further rehabilitation therapy.


Reason For Visit: 


CVA HTN DEMENTIA








Physical Exam


Vital Signs: 


 











Temp Pulse Resp BP Pulse Ox


 


 97.6 F   73   20   99/69 L  93 


 


 10/31/18 11:47  10/31/18 11:47  10/31/18 11:47  10/31/18 11:47  10/31/18 11:47








 Intake & Output











 10/29/18 10/30/18 10/31/18





 23:59 23:59 23:59


 


Intake Total 942 953 838


 


Output Total 1400 1025 650


 


Balance -458 -72 188


 


Weight 86.3 kg 86.3 kg 86.3 kg











General appearance: PRESENT: no acute distress, cooperative


Head exam: PRESENT: atraumatic, normocephalic


Eye exam: PRESENT: conjunctiva pink, EOMI


Ear exam: PRESENT: normal external ear exam.  ABSENT: bleeding


Mouth exam: PRESENT: neck supple, tongue midline


Neck exam: ABSENT: thyromegaly, tracheal deviation


Respiratory exam: PRESENT: clear to auscultation gautam, symmetrical, unlabored


Cardiovascular exam: PRESENT: RRR.  ABSENT: bradycardia, clicks, diastolic 

murmur, gallop, rubs, systolic murmur, tachycardia


Vascular exam: PRESENT: normal capillary refill.  ABSENT: pallor


GI/Abdominal exam: PRESENT: normal bowel sounds, soft


Rectal exam: PRESENT: deferred


Extremities exam: ABSENT: joint swelling, pedal edema


Musculoskeletal exam: ABSENT: deformity, dislocation


Neurological exam: PRESENT: alert, oriented to person, oriented to place, 

oriented to time, oriented to situation


Psychiatric exam: PRESENT: appropriate affect, normal mood


Skin exam: ABSENT: jaundice, rash, urticaria





Results


Laboratory Results: 


 





 10/31/18 11:35 





 10/31/18 11:35 





 











  10/31/18 10/31/18





  11:35 11:35


 


WBC  12.4 H 


 


RBC  4.78 


 


Hgb  14.7 


 


Hct  43.5 


 


MCV  91 


 


MCH  30.9 


 


MCHC  33.9 


 


RDW  13.3 


 


Plt Count  249 


 


Seg Neutrophils %  61.0 


 


Lymphocytes %  25.7 


 


Monocytes %  8.6 


 


Eosinophils %  4.2 


 


Basophils %  0.5 


 


Absolute Neutrophils  7.5 


 


Absolute Lymphocytes  3.2 


 


Absolute Monocytes  1.1 


 


Absolute Eosinophils  0.5 


 


Absolute Basophils  0.1 


 


Sodium   143.7


 


Potassium   3.8


 


Chloride   103


 


Carbon Dioxide   28


 


Anion Gap   13


 


BUN   18


 


Creatinine   0.72


 


Est GFR ( Amer)   > 60


 


Est GFR (Non-Af Amer)   > 60


 


Glucose   130 H


 


Calcium   9.5


 


Magnesium   2.1


 


Total Bilirubin   1.1


 


AST   26


 


ALT   27


 


Alkaline Phosphatase   97


 


Total Protein   6.6


 


Albumin   3.8








 











  10/26/18 10/26/18 10/26/18





  04:02 04:02 10:40


 


Creatine Kinase   44 L  45 L


 


CK-MB (CK-2)  0.57  


 


Troponin I  < 0.012  














  10/26/18 10/26/18 10/26/18





  10:40 16:00 16:00


 


Creatine Kinase   55 


 


CK-MB (CK-2)  0.53   0.51


 


Troponin I  < 0.012   < 0.012











Impressions: 


 





Chest X-Ray  10/25/18 21:35


IMPRESSION: 


 


There is no acute pleural-parenchymal process seen in the imaged


lung fields.


 


Location of Interpretation: Teleradiology


 








Head CT  10/25/18 21:35


IMPRESSION: 


 


There is no acute intracranial abnormality.


 


Age related and chronic involutional changes are seen.


 








Head MRI  10/26/18 00:00


IMPRESSION:


1. RESTRICTED DIFFUSION IN THE RIGHT PARIETAL AND TEMPORAL LOBE CONSISTENT WITH 

ACUTE INFARCT.


2. SURGICAL CHANGES INVOLVING THE LEFT CEREBELLUM.


3. ATROPHY AND CHRONIC MICRO-VASCULAR ISCHEMIC CHANGES.


EVIDENCE OF ACUTE STROKE: YES.  RIGHT MCA.


 








Carotid Doppler Study  10/26/18 00:22


IMPRESSION:  NO HEMODYNAMICALLY SIGNIFICANT STENOSIS.


 














Assessment & Plan





- Diagnosis


(1) Acute embolic stroke


Is this a current diagnosis for this admission?: Yes   


Plan: 


Patient will be continued on the current comprehensive post ischemic stroke 

treatment program with our stroke team here in the hospital and with their 

ongoing support during his post hospital rehabilitation.  He will be maintained 

on low-dose aspirin therapy and he will also be on apixaban which will be used 

to help her to prevent any future embolic strokes due to his paroxysmal atrial 

fibrillation.





10/31/18:


Patient's heart rate has returned to normal and his rhythm is normal sinus and 

regular.  He will be continued on apixaban as discussed above as it appears 

that he has paroxysmal events of atrial fibrillation.








(2) Paroxysmal atrial fibrillation with rapid ventricular response


Is this a current diagnosis for this admission?: Yes   


Plan: 


The diagnosis was made today by telemetry recording.  Patient will be started 

on apixaban 5 mg p.o. twice daily as well as metoprolol succinate 200 mg p.o. 

daily for control of his rapid ventricular response and management to prevent 

embolic stroke.





10/31/18:


On his monitor today the patient is a normal sinus rhythm.  Plan to continue 

apixaban 5 mg twice daily and his current dose of metoprolol succinate 200 mg 

daily for ongoing control.  Possible reduction to the metoprolol dose will be 

entertained if his blood pressure continues to be soft.








(3) Left-sided neglect


Is this a current diagnosis for this admission?: Yes   


Plan: 


Patient will continue in occupational therapy for ongoing treatment and of his 

left-sided neglect with persistence.








(4) Left-sided weakness


Is this a current diagnosis for this admission?: Yes   


Plan: 


Patient will be continued with physical therapy for ongoing treatment of his 

left-sided weakness and stroke related physical issues.








(5) Dysphagia


Qualifiers: 


   Dysphagia type: unspecified   Qualified Code(s): R13.10 - Dysphagia, 

unspecified   


Is this a current diagnosis for this admission?: Yes   


Plan: 


Patient will be continued with speech therapy for treatment of his dysphagia.  

He has been tolerating the therapy recommended diet well.








(6) Hypertension


Qualifiers: 


   Hypertension type: essential hypertension   Qualified Code(s): I10 - 

Essential (primary) hypertension   


Is this a current diagnosis for this admission?: Yes   


Plan: 


We will control the patient's blood pressure utilizing metoprolol succinate.  

Ongoing antihypertensive therapy will need reassessment and adjustment as 

appropriate in his posthospital rehabilitative course.








(7) Opiate dependence


Qualifiers: 


   Substance use status: uncomplicated   Qualified Code(s): F11.20 - Opioid 

dependence, uncomplicated   


Is this a current diagnosis for this admission?: Yes   


Plan: 


We will plan to try to decrease the patient's opiate use with gradual reduction 

in opiates provided.  This will need continue during the patient's post 

hospital rehabilitation course.








- Time


Time Spent with patient: 25-34 minutes


Medications reviewed and adjusted accordingly: Yes


Anticipated discharge: SNF


Within: when bed available

## 2018-11-01 RX ADMIN — CALCIUM CARBONATE (ANTACID) CHEW TAB 500 MG PRN MG: 500 CHEW TAB at 17:25

## 2018-11-01 RX ADMIN — MORPHINE SULFATE SCH MG: 15 TABLET, EXTENDED RELEASE ORAL at 13:34

## 2018-11-01 RX ADMIN — ASPIRIN SCH MG: 81 TABLET, CHEWABLE ORAL at 09:49

## 2018-11-01 RX ADMIN — DULOXETINE SCH MG: 30 CAPSULE, DELAYED RELEASE ORAL at 09:49

## 2018-11-01 RX ADMIN — MORPHINE SULFATE SCH MG: 15 TABLET, EXTENDED RELEASE ORAL at 05:06

## 2018-11-01 RX ADMIN — GABAPENTIN SCH MG: 300 CAPSULE ORAL at 05:06

## 2018-11-01 RX ADMIN — APIXABAN SCH MG: 5 TABLET, FILM COATED ORAL at 17:23

## 2018-11-01 RX ADMIN — APIXABAN SCH MG: 5 TABLET, FILM COATED ORAL at 09:49

## 2018-11-01 RX ADMIN — ATORVASTATIN CALCIUM SCH MG: 80 TABLET, FILM COATED ORAL at 21:41

## 2018-11-01 RX ADMIN — Medication SCH ML: at 05:06

## 2018-11-01 RX ADMIN — Medication SCH ML: at 21:41

## 2018-11-01 RX ADMIN — Medication SCH ML: at 13:34

## 2018-11-01 RX ADMIN — MORPHINE SULFATE SCH MG: 15 TABLET, EXTENDED RELEASE ORAL at 21:41

## 2018-11-01 RX ADMIN — GABAPENTIN SCH MG: 300 CAPSULE ORAL at 21:41

## 2018-11-01 RX ADMIN — GABAPENTIN SCH MG: 300 CAPSULE ORAL at 13:34

## 2018-11-01 RX ADMIN — TRAZODONE HYDROCHLORIDE SCH MG: 50 TABLET ORAL at 21:41

## 2018-11-01 RX ADMIN — DIPHENHYDRAMINE HYDROCHLORIDE PRN MG: 25 CAPSULE ORAL at 15:02

## 2018-11-01 RX ADMIN — METOPROLOL SUCCINATE SCH MG: 50 TABLET, EXTENDED RELEASE ORAL at 09:49

## 2018-11-01 NOTE — PDOC PROGRESS REPORT
Subjective


Progress Note for:: 11/01/18


Subjective:: 





10/26/18:


MACKENZIE MCDONALD JR is a 68 year old male who presented to the ER after being 

found down at his home.  He was found by his son on the floor with dysarthria, 

left-sided weakness and cierra-neglect with persistence.  He has no history of CVA

, atrial fibrillation or palpitations.   Initial CT imaging was unremarkable.  

He was out of the window for TPA interventional therapy and failed a swallow 

trial in the ER. He  received MA aspirin and was admitted to the hospitalist 

service.





10/30/18:


Since his admission Mr. Mcdonald has been treated with physical therapy, 

occupational therapy and speech therapy.  His hypertension is been well 

controlled and he has been medically stable.  He was noted to have a run of 

atrial fibrillation with rapid ventricular response lasting for apparently 

several minutes about the time of my visit.  I noted the abnormal rhythm on 

auscultation chest just as I was approached by his nurse with a rhythm strip 

from his telemetry monitor.  Patient was asymptomatic with this change.  He was 

evaluated by the acute rehab facility today however they have determined that 

due to his dementia he will probably need to go to a skilled nursing facility 

for rehab.  Patient's medications will be adjusted to control his rapid 

ventricular response rate.  This will be done utilizing metoprolol succinate as 

the patient is also mildly hypertensive.





10/31/18:


Mr. Mcdonald continues to do well with physical therapy, occupational therapy 

and speech therapy.  He was noted to have a mild episode of confusion earlier 

today but this seems to have resolved.  His blood pressure continues to be well 

controlled and he is not been noted to have any further rapid ventricular 

response to his paroxysmal occurrences of atrial fibrillation.  He states that 

he is getting by okay and denies any acute pain or problems.  He has been able 

to eat and drink without recurrence of nausea or vomiting.  He continues to 

have inattention to the left side with very mild left-sided weakness.  His 

examination is essentially unchanged since yesterday.  We are currently waiting 

for a skilled nursing facility bed for the patient to be transferred for 

further rehabilitation therapy.





11/1/18:


Mr. Mcdonald continues to show gradual but slow improvement with his multiple 

therapies.  He has not had any further episodes of confusion and his blood 

pressure and heart rate are well controlled.  He has had no further episodes of 

paroxysmal atrial fibrillation to the best of my knowledge.  He denies pain and 

further states that he has been eating and drinking reasonably well without any 

recurrence of nausea or vomiting.  He denies headache, dyspnea, fever and 

chills.  His inattention to the left side with persistence is still noted and a 

very mild left-sided weakness is also still present.  Examination is again 

essentially unchanged and we are again waiting for a skilled nursing facility 

bed for the patient to be transferred.


Reason For Visit: 


CVA HTN DEMENTIA








Physical Exam


Vital Signs: 


 











Temp Pulse Resp BP Pulse Ox


 


 98.4 F   70   16   117/56 L  95 


 


 11/01/18 11:38  11/01/18 11:38  11/01/18 11:38  11/01/18 11:38  11/01/18 11:38








 Intake & Output











 10/30/18 10/31/18 11/01/18





 23:59 23:59 23:59


 


Intake Total 953 1075 236


 


Output Total 1025 800 175


 


Balance -72 275 61


 


Weight 86.3 kg 86.3 kg 86.9 kg











General appearance: PRESENT: no acute distress, cooperative


Head exam: PRESENT: atraumatic, normocephalic


Eye exam: ABSENT: conjunctival injection, nystagmus, scleral icterus


Ear exam: PRESENT: normal external ear exam.  ABSENT: bleeding, drainage


Respiratory exam: PRESENT: clear to auscultation gautam, symmetrical, unlabored


Cardiovascular exam: PRESENT: RRR.  ABSENT: clicks, gallop, rubs


Vascular exam: PRESENT: normal capillary refill.  ABSENT: pallor


GI/Abdominal exam: PRESENT: normal bowel sounds, soft


Rectal exam: PRESENT: deferred


Extremities exam: ABSENT: joint swelling, pedal edema


Musculoskeletal exam: ABSENT: deformity, dislocation


Neurological exam: PRESENT: alert, oriented to person, oriented to place, 

oriented to time, oriented to situation


Psychiatric exam: PRESENT: appropriate affect, normal mood


Skin exam: ABSENT: jaundice, rash, urticaria





Results


Laboratory Results: 


 





 10/31/18 11:35 





 10/31/18 11:35 





 











  10/26/18 10/26/18 10/26/18





  04:02 04:02 10:40


 


Creatine Kinase   44 L  45 L


 


CK-MB (CK-2)  0.57  


 


Troponin I  < 0.012  














  10/26/18 10/26/18 10/26/18





  10:40 16:00 16:00


 


Creatine Kinase   55 


 


CK-MB (CK-2)  0.53   0.51


 


Troponin I  < 0.012   < 0.012











Impressions: 


 





Chest X-Ray  10/25/18 21:35


IMPRESSION: 


 


There is no acute pleural-parenchymal process seen in the imaged


lung fields.


 


Location of Interpretation: Teleradiology


 








Head CT  10/25/18 21:35


IMPRESSION: 


 


There is no acute intracranial abnormality.


 


Age related and chronic involutional changes are seen.


 








Head MRI  10/26/18 00:00


IMPRESSION:


1. RESTRICTED DIFFUSION IN THE RIGHT PARIETAL AND TEMPORAL LOBE CONSISTENT WITH 

ACUTE INFARCT.


2. SURGICAL CHANGES INVOLVING THE LEFT CEREBELLUM.


3. ATROPHY AND CHRONIC MICRO-VASCULAR ISCHEMIC CHANGES.


EVIDENCE OF ACUTE STROKE: YES.  RIGHT MCA.


 








Carotid Doppler Study  10/26/18 00:22


IMPRESSION:  NO HEMODYNAMICALLY SIGNIFICANT STENOSIS.


 














Assessment & Plan





- Diagnosis


(1) Acute embolic stroke


Is this a current diagnosis for this admission?: Yes   


Plan: 


Patient will be continued on the current comprehensive post ischemic stroke 

treatment program with our stroke team here in the hospital and with their 

ongoing support during his post hospital rehabilitation.  He will be maintained 

on low-dose aspirin therapy and he will also be on apixaban which will be used 

to help her to prevent any future embolic strokes due to his paroxysmal atrial 

fibrillation.





10/31/18:


Patient's heart rate has returned to normal and his rhythm is normal sinus and 

regular.  He will be continued on apixaban as discussed above as it appears 

that he has paroxysmal events of atrial fibrillation.








(2) Paroxysmal atrial fibrillation with rapid ventricular response


Is this a current diagnosis for this admission?: Yes   


Plan: 


The diagnosis was made today by telemetry recording.  Patient will be started 

on apixaban 5 mg p.o. twice daily as well as metoprolol succinate 200 mg p.o. 

daily for control of his rapid ventricular response and management to prevent 

embolic stroke.





10/31/18:


On his monitor today the patient is a normal sinus rhythm.  Plan to continue 

apixaban 5 mg twice daily and his current dose of metoprolol succinate 200 mg 

daily for ongoing control.  Possible reduction to the metoprolol dose will be 

entertained if his blood pressure continues to be soft.





11/1/18:


Patient continues to be in sinus rhythm.  He continues on apixaban 5 mg twice 

daily.  He is also continued on his metoprolol succinate 200 mg p.o. daily as 

his blood pressure has not shown any further" softness".








(3) Left-sided neglect


Is this a current diagnosis for this admission?: Yes   


Plan: 


Patient will continue in occupational therapy for ongoing treatment and of his 

left-sided neglect with persistence.








(4) Left-sided weakness


Is this a current diagnosis for this admission?: Yes   


Plan: 


Patient will be continued with physical therapy for ongoing treatment of his 

left-sided weakness and stroke related physical issues.








(5) Dysphagia


Qualifiers: 


   Dysphagia type: unspecified   Qualified Code(s): R13.10 - Dysphagia, 

unspecified   


Is this a current diagnosis for this admission?: Yes   


Plan: 


Patient will be continued with speech therapy for treatment of his dysphagia.  

He has been tolerating the therapy recommended diet well.








(6) Hypertension


Qualifiers: 


   Hypertension type: essential hypertension   Qualified Code(s): I10 - 

Essential (primary) hypertension   


Is this a current diagnosis for this admission?: Yes   


Plan: 


We will control the patient's blood pressure utilizing metoprolol succinate.  

Ongoing antihypertensive therapy will need reassessment and adjustment as 

appropriate in his posthospital rehabilitative course.








(7) Opiate dependence


Qualifiers: 


   Substance use status: uncomplicated   Qualified Code(s): F11.20 - Opioid 

dependence, uncomplicated   


Is this a current diagnosis for this admission?: Yes   


Plan: 


We will plan to try to decrease the patient's opiate use with gradual reduction 

in opiates provided.  This will need continue during the patient's post 

hospital rehabilitation course.








- Time


Time Spent with patient: 15-24 minutes


Anticipated discharge: SNF


Within: when bed available

## 2018-11-02 VITALS — SYSTOLIC BLOOD PRESSURE: 95 MMHG | DIASTOLIC BLOOD PRESSURE: 54 MMHG

## 2018-11-02 RX ADMIN — ASPIRIN SCH MG: 81 TABLET, CHEWABLE ORAL at 08:59

## 2018-11-02 RX ADMIN — APIXABAN SCH MG: 5 TABLET, FILM COATED ORAL at 08:59

## 2018-11-02 RX ADMIN — Medication SCH ML: at 05:54

## 2018-11-02 RX ADMIN — DULOXETINE SCH MG: 30 CAPSULE, DELAYED RELEASE ORAL at 08:59

## 2018-11-02 RX ADMIN — GABAPENTIN SCH MG: 300 CAPSULE ORAL at 05:54

## 2018-11-02 RX ADMIN — MORPHINE SULFATE SCH MG: 15 TABLET, EXTENDED RELEASE ORAL at 05:52

## 2018-11-02 RX ADMIN — METOPROLOL SUCCINATE SCH: 50 TABLET, EXTENDED RELEASE ORAL at 09:00

## 2018-11-02 NOTE — PDOC TRANSFER SUMMARY
General


Admission Date/PCP: 


  10/26/18 00:26





  MIHIR BYRD MD





Admission Date: 10/26/18


Transfer Date: 11/02/18


Accepting Facility: Other (Comments) - [facility]


Accepting Physician: Dr. Tyrone White


Resuscitation Status: Full Code





- Transfer Diagnosis


(1) Acute embolic stroke


Is this a current diagnosis for this admission?: Yes   


Diagnosis Summary: 


10/26/18:


Patient will be continued on the current comprehensive post ischemic stroke 

treatment program with our stroke team here in the hospital and with their 

ongoing support during his post hospital rehabilitation. 


 


10/30/18:


Patient was discovered to have paroxysmal atrial fibrillation with a rapid 

ventricular response rate on his cardiac monitor.  This was treated with 

metoprolol to which the patient responded very well.


He will be maintained on low-dose aspirin therapy and he will also be on 

apixaban which will be used to help her to prevent any future embolic strokes 

due to his paroxysmal atrial fibrillation.





10/31/18:


Patient's heart rate has returned to normal and his rhythm is normal sinus and 

regular on Toprol- mg daily.  He will be continued on apixaban 5 mg twice 

daily for paroxysmal atrial fibrillation.








(2) Paroxysmal atrial fibrillation with rapid ventricular response


Is this a current diagnosis for this admission?: Yes   


Diagnosis Summary: 


10/30/18:


The diagnosis was made today by telemetry recording.  Patient will be started 

on apixaban 5 mg p.o. twice daily as well as metoprolol succinate 200 mg p.o. 

daily for control of his rapid ventricular response and management to prevent 

embolic stroke.





10/31/18:


On his monitor today the patient is a normal sinus rhythm.  Plan to continue 

apixaban 5 mg twice daily and his current dose of metoprolol succinate 200 mg 

daily for ongoing control.  Possible reduction to the metoprolol dose will be 

entertained if his blood pressure continues to be soft.





11/1/18:


Patient continues to be in sinus rhythm.  He continues on apixaban 5 mg twice 

daily.  He is also continued on his metoprolol succinate 200 mg p.o. daily as 

his blood pressure has not shown any further" softness".








(3) Left-sided neglect


Is this a current diagnosis for this admission?: Yes   


Diagnosis Summary: 


Patient will continue in occupational therapy for ongoing treatment and of his 

left-sided neglect with persistence.








(4) Left-sided weakness


Is this a current diagnosis for this admission?: Yes   


Diagnosis Summary: 


Patient will be continued with physical therapy for ongoing treatment of his 

left-sided weakness and stroke related physical issues.








(5) Dysphagia


Is this a current diagnosis for this admission?: Yes   


Diagnosis Summary: 


Patient will be continued with speech therapy for treatment of his dysphagia.  

He has been tolerating the therapy recommended diet well.








(6) Hypertension


Is this a current diagnosis for this admission?: Yes   


Diagnosis Summary: 


Patient's blood pressure was controlled utilizing metoprolol succinate 200 mg 

daily.  Ongoing antihypertensive therapy will need reassessment and adjustment 

as appropriate in his posthospital rehabilitative course.











(7) Opiate dependence


Is this a current diagnosis for this admission?: Yes   


Diagnosis Summary: 


The patient's chronic opiate use/dependence was treated with with gradual 

reduction in opiates provided.  This should continue during the patient's post 

hospital rehabilitation course.








- Transfer Medications


Home Medications: 


 





Duloxetine HCl [Cymbalta] 30 mg PO DAILY 10/26/18 


Morphine Sulfate [Morphine Sulfate ER] 30 mg PO Q8 10/26/18 


Trazodone HCl [Desyrel] 150 mg PO QHS 10/26/18 








Transfer Medications: 


 Current Medications





Acetaminophen (Tylenol 650 Mg Supp)  650 mg NE Q4HP PRN


   PRN Reason: Temp greater than 101F


   Stop: 11/25/18 00:20


Apixaban (Eliquis 5 Mg Tablet)  5 mg PO BID IMELDA


   Stop: 11/29/18 12:29


   Last Admin: 11/01/18 17:23 Dose:  5 mg


Aspirin (Aspirin 81 Mg Chewable Tablet)  81 mg PO DAILY IMELDA


   Stop: 11/30/18 09:59


   Last Admin: 11/01/18 09:49 Dose:  81 mg


Atorvastatin Calcium (Lipitor 80 Mg Tablet)  80 mg PO QHS IMELDA


   Stop: 11/25/18 21:59


   Last Admin: 11/01/18 21:41 Dose:  80 mg


Calcium Carbonate (Tums Chewable 500 Mg Tab.Chew)  1,000 mg PO Q6HP PRN


   PRN Reason: HEARTBURN


   Stop: 11/27/18 19:28


   Last Admin: 11/01/18 17:25 Dose:  1,000 mg


Diphenhydramine HCl (Benadryl 25 Mg Capsule)  25 mg PO Q8HP PRN


   PRN Reason: ITCHING


   Stop: 11/28/18 06:38


   Last Admin: 11/01/18 15:02 Dose:  25 mg


Docusate Sodium (Colace 100 Mg Capsule)  100 mg PO BIDP PRN


   PRN Reason: FOR CONSTIPATION


   Stop: 11/25/18 00:20


   Last Admin: 10/28/18 21:20 Dose:  100 mg


Duloxetine HCl (Cymbalta 30 Mg Capsule.Dr)  60 mg PO DAILY IMELDA


   Stop: 11/29/18 12:59


   Last Admin: 11/01/18 09:49 Dose:  60 mg


Gabapentin (Neurontin 300 Mg Capsule)  300 mg PO Q8 IMELDA


   Stop: 11/29/18 12:59


   Last Admin: 11/02/18 05:54 Dose:  300 mg


Guaifenesin (Robitussin Syrup 200 Mg/10 Ml Ud Cup)  200 mg PO Q4HP PRN


   PRN Reason: COUGH


   Stop: 11/28/18 11:25


   Last Admin: 10/30/18 18:18 Dose:  200 mg


Hydralazine HCl (Apresoline Inj/Pf 20 Mg/1 Ml Sdv)  10 mg IV Q4HP PRN


   PRN Reason: Give For Sbp > [190]


   Stop: 11/25/18 05:26


Lorazepam (Ativan Inj 2 Mg/1 Ml Vial)  0.5 mg IV Q8HP PRN


   PRN Reason: ANXIETY


   Stop: 11/02/18 09:29


   Last Admin: 10/31/18 23:06 Dose:  0.5 mg


Metoprolol Succinate (Toprol Xl 50 Mg Tab.Sr)  200 mg PO DAILY [facility]


   Stop: 11/29/18 12:29


   Last Admin: 11/01/18 09:49 Dose:  200 mg


Mineral Oil (Fleet Mineral Oil Enema 133 Ml)  133 ml NE BIDP PRN


   PRN Reason: UNRESOLVED CONSTIPATION


   Stop: 11/25/18 05:26


Morphine Sulfate (Morphine 10 Mg/Ml Inj)  4 mg IV Q2HP PRN


   PRN Reason: FOR PAIN


   Stop: 11/02/18 14:55


   Last Admin: 10/30/18 16:57 Dose:  4 mg


Morphine Sulfate (Ms-Contin Sr 15 Mg Tablet)  15 mg PO Q8 IMELDA


   Stop: 11/06/18 13:59


   Last Admin: 11/02/18 05:52 Dose:  15 mg


Sodium Chloride (Saline Flush 2.5 Ml Monoject Prefil Syrin)  2.5 ml IV Q8 IMELDA


   Stop: 11/25/18 05:59


   Last Admin: 11/02/18 05:54 Dose:  2.5 ml


Trazodone HCl (Desyrel 50 Mg Tablet)  150 mg PO QHS [facility]


   Stop: 11/29/18 21:59


   Last Admin: 11/01/18 21:41 Dose:  150 mg











- Allergies


Allergies/Adverse Reactions: 


 





No Known Allergies Allergy (Verified 01/27/17 09:01)


 











- Diet/Activity


Discharge Diet: Cardiac





Hospital Course


Hospital Course: 


10/26/18:


MACKENZIE MCDONALD JR is a 68 year old male who presented to the ER after being 

found down at his home.  He was found by his son on the floor with dysarthria, 

left-sided weakness and cierra-neglect with persistence.  He has no history of CVA

, atrial fibrillation or palpitations.   Initial CT imaging was unremarkable.  

He was out of the window for TPA interventional therapy and failed a swallow 

trial in the ER. He  received NE aspirin and was admitted to the hospitalist 

service.  He was placed on the stroke unit and standard ischemic stroke 

protocols were undertaken.





10/30/18:


Since his admission Mr. Mcdonald has been treated with physical therapy, 

occupational therapy and speech therapy.  His hypertension is been well 

controlled and he has been medically stable.  He was noted to have a run of 

atrial fibrillation with rapid ventricular response lasting for apparently 

several minutes about the time of my visit.  I noted the abnormal rhythm on 

auscultation chest just as I was approached by his nurse with a rhythm strip 

from his telemetry monitor.  Patient was asymptomatic with this change.  He was 

evaluated by the acute rehab facility today however they have determined that 

due to his dementia he will probably need to go to a skilled nursing facility 

for rehab.  Patient's medications will be adjusted to control his rapid 

ventricular response rate.  This will be done utilizing metoprolol succinate as 

the patient is also mildly hypertensive.





10/31/18:


Mr. Mcdonald continues to do well with physical therapy, occupational therapy 

and speech therapy.  He was noted to have a mild episode of confusion earlier 

today but this seems to have resolved.  His blood pressure continues to be well 

controlled and he is not been noted to have any further rapid ventricular 

response to his paroxysmal occurrences of atrial fibrillation.  He states that 

he is getting by okay and denies any acute pain or problems.  He has been able 

to eat and drink without recurrence of nausea or vomiting.  He continues to 

have inattention to the left side with very mild left-sided weakness.  His 

examination is essentially unchanged since yesterday.  We are currently waiting 

for an acuterehab/skilled nursing facility bed for the patient to be 

transferred for further rehabilitation therapy.





11/1/18:


Mr. Mcdonald continues to show gradual but slow improvement with his multiple 

therapies.  He has not had any further episodes of confusion and his blood 

pressure and heart rate are well controlled.  He has had no further episodes of 

paroxysmal atrial fibrillation to the best of my knowledge.  He denies pain and 

further states that he has been eating and drinking reasonably well without any 

recurrence of nausea or vomiting.  He denies headache, dyspnea, fever and 

chills.  His inattention to the left side with persistence is still noted and a 

very mild left-sided weakness is also still present.  Examination is again 

essentially unchanged and we are again waiting for an acute rehab/skilled 

nursing facility bed for the patient to be transferred.





11/2/18:


Mr. Mcdonald has a confirmed bed at [facility] and 

will be transferred there today.  He is medically stable for transfer 

evaluation this morning.





Physical Exam


Vital Signs: 


 











Temp Pulse Resp BP Pulse Ox


 


 98.0 F   72   16   110/70   95 


 


 11/02/18 03:22  11/02/18 03:22  11/02/18 03:22  11/02/18 03:22  11/02/18 03:22








 Intake & Output











 10/31/18 11/01/18 11/02/18





 23:59 23:59 23:59


 


Intake Total 1075 680 


 


Output Total 800 175 


 


Balance 275 505 


 


Weight 86.3 kg 86.9 kg 88.3 kg











General appearance: PRESENT: no acute distress, cooperative


Head exam: PRESENT: atraumatic, normocephalic


Eye exam: ABSENT: conjunctival injection, scleral icterus


Respiratory exam: PRESENT: clear to auscultation gautam, symmetrical, unlabored


Cardiovascular exam: PRESENT: RRR.  ABSENT: clicks, diastolic murmur, gallop, 

rubs, systolic murmur


Vascular exam: PRESENT: normal capillary refill.  ABSENT: pallor


Neurological exam: PRESENT: alert, oriented to person, oriented to place, 

oriented to time, oriented to situation


Psychiatric exam: PRESENT: appropriate affect, normal mood





Results


Laboratory Results: 


 





 10/31/18 11:35 





 10/31/18 11:35 





 











  10/26/18 10/26/18 10/26/18





  04:02 04:02 10:40


 


Creatine Kinase   44 L  45 L


 


CK-MB (CK-2)  0.57  


 


Troponin I  < 0.012  














  10/26/18 10/26/18 10/26/18





  10:40 16:00 16:00


 


Creatine Kinase   55 


 


CK-MB (CK-2)  0.53   0.51


 


Troponin I  < 0.012   < 0.012











Impressions: 


 





Chest X-Ray  10/25/18 21:35


IMPRESSION: 


 


There is no acute pleural-parenchymal process seen in the imaged


lung fields.


 


Location of Interpretation: Teleradiology


 








Head CT  10/25/18 21:35


IMPRESSION: 


 


There is no acute intracranial abnormality.


 


Age related and chronic involutional changes are seen.


 








Head MRI  10/26/18 00:00


IMPRESSION:


1. RESTRICTED DIFFUSION IN THE RIGHT PARIETAL AND TEMPORAL LOBE CONSISTENT WITH 

ACUTE INFARCT.


2. SURGICAL CHANGES INVOLVING THE LEFT CEREBELLUM.


3. ATROPHY AND CHRONIC MICRO-VASCULAR ISCHEMIC CHANGES.


EVIDENCE OF ACUTE STROKE: YES.  RIGHT MCA.


 








Carotid Doppler Study  10/26/18 00:22


IMPRESSION:  NO HEMODYNAMICALLY SIGNIFICANT STENOSIS.


 














Plan


Discharge Plan: 





Patient is discharged to the [facility] in improved 

and stable condition.


Time Spent: Greater than 30 Minutes

## 2018-11-22 ENCOUNTER — HOSPITAL ENCOUNTER (EMERGENCY)
Dept: HOSPITAL 62 - ER | Age: 68
Discharge: ANOTHER HOSPITAL | End: 2018-11-22
Payer: OTHER GOVERNMENT

## 2018-11-22 VITALS — SYSTOLIC BLOOD PRESSURE: 131 MMHG | DIASTOLIC BLOOD PRESSURE: 83 MMHG

## 2018-11-22 DIAGNOSIS — R10.84: ICD-10-CM

## 2018-11-22 DIAGNOSIS — R11.0: ICD-10-CM

## 2018-11-22 DIAGNOSIS — Z90.49: ICD-10-CM

## 2018-11-22 DIAGNOSIS — K56.41: ICD-10-CM

## 2018-11-22 DIAGNOSIS — F03.90: ICD-10-CM

## 2018-11-22 DIAGNOSIS — I10: ICD-10-CM

## 2018-11-22 DIAGNOSIS — K80.50: Primary | ICD-10-CM

## 2018-11-22 LAB
ADD MANUAL DIFF: NO
ALBUMIN SERPL-MCNC: 4 G/DL (ref 3.5–5)
ALP SERPL-CCNC: 124 U/L (ref 38–126)
ALT SERPL-CCNC: 28 U/L (ref 21–72)
ANION GAP SERPL CALC-SCNC: 12 MMOL/L (ref 5–19)
APPEARANCE UR: (no result)
APTT PPP: YELLOW S
AST SERPL-CCNC: 28 U/L (ref 17–59)
BASOPHILS # BLD AUTO: 0.1 10^3/UL (ref 0–0.2)
BASOPHILS NFR BLD AUTO: 0.8 % (ref 0–2)
BILIRUB DIRECT SERPL-MCNC: 0.3 MG/DL (ref 0–0.4)
BILIRUB SERPL-MCNC: 1.2 MG/DL (ref 0.2–1.3)
BILIRUB UR QL STRIP: NEGATIVE
BUN SERPL-MCNC: 12 MG/DL (ref 7–20)
CALCIUM: 9.6 MG/DL (ref 8.4–10.2)
CHLORIDE SERPL-SCNC: 105 MMOL/L (ref 98–107)
CO2 SERPL-SCNC: 27 MMOL/L (ref 22–30)
EOSINOPHIL # BLD AUTO: 0.2 10^3/UL (ref 0–0.6)
EOSINOPHIL NFR BLD AUTO: 2.3 % (ref 0–6)
ERYTHROCYTE [DISTWIDTH] IN BLOOD BY AUTOMATED COUNT: 13.5 % (ref 11.5–14)
GLUCOSE SERPL-MCNC: 128 MG/DL (ref 75–110)
GLUCOSE UR STRIP-MCNC: NEGATIVE MG/DL
HCT VFR BLD CALC: 40.5 % (ref 37.9–51)
HGB BLD-MCNC: 13.9 G/DL (ref 13.5–17)
KETONES UR STRIP-MCNC: (no result) MG/DL
LIPASE SERPL-CCNC: 134.8 U/L (ref 23–300)
LYMPHOCYTES # BLD AUTO: 2.9 10^3/UL (ref 0.5–4.7)
LYMPHOCYTES NFR BLD AUTO: 28.5 % (ref 13–45)
MCH RBC QN AUTO: 31.2 PG (ref 27–33.4)
MCHC RBC AUTO-ENTMCNC: 34.3 G/DL (ref 32–36)
MCV RBC AUTO: 91 FL (ref 80–97)
MONOCYTES # BLD AUTO: 0.9 10^3/UL (ref 0.1–1.4)
MONOCYTES NFR BLD AUTO: 8.5 % (ref 3–13)
NEUTROPHILS # BLD AUTO: 6 10^3/UL (ref 1.7–8.2)
NEUTS SEG NFR BLD AUTO: 59.9 % (ref 42–78)
NITRITE UR QL STRIP: NEGATIVE
PH UR STRIP: 6 [PH] (ref 5–9)
PLATELET # BLD: 265 10^3/UL (ref 150–450)
POTASSIUM SERPL-SCNC: 4 MMOL/L (ref 3.6–5)
PROT SERPL-MCNC: 6.9 G/DL (ref 6.3–8.2)
PROT UR STRIP-MCNC: NEGATIVE MG/DL
RBC # BLD AUTO: 4.45 10^6/UL (ref 4.35–5.55)
SODIUM SERPL-SCNC: 144 MMOL/L (ref 137–145)
SP GR UR STRIP: 1.02
TOTAL CELLS COUNTED % (AUTO): 100 %
UROBILINOGEN UR-MCNC: 2 MG/DL (ref ?–2)
WBC # BLD AUTO: 10 10^3/UL (ref 4–10.5)

## 2018-11-22 PROCEDURE — 76705 ECHO EXAM OF ABDOMEN: CPT

## 2018-11-22 PROCEDURE — 96375 TX/PRO/DX INJ NEW DRUG ADDON: CPT

## 2018-11-22 PROCEDURE — 83690 ASSAY OF LIPASE: CPT

## 2018-11-22 PROCEDURE — 99285 EMERGENCY DEPT VISIT HI MDM: CPT

## 2018-11-22 PROCEDURE — 85025 COMPLETE CBC W/AUTO DIFF WBC: CPT

## 2018-11-22 PROCEDURE — 80053 COMPREHEN METABOLIC PANEL: CPT

## 2018-11-22 PROCEDURE — 74177 CT ABD & PELVIS W/CONTRAST: CPT

## 2018-11-22 PROCEDURE — 96361 HYDRATE IV INFUSION ADD-ON: CPT

## 2018-11-22 PROCEDURE — 81001 URINALYSIS AUTO W/SCOPE: CPT

## 2018-11-22 PROCEDURE — 96374 THER/PROPH/DIAG INJ IV PUSH: CPT

## 2018-11-22 PROCEDURE — 36415 COLL VENOUS BLD VENIPUNCTURE: CPT

## 2018-11-22 NOTE — RADIOLOGY REPORT (SQ)
Ultrasound right upper quadrant on 11/22/2018 at 4:13 AM



CLINICAL INDICATION: Common bile duct stones visualized on CT



COMPARISON: CT from 11/22/2018



FINDINGS: Multiple sonographic images are obtained throughout the

right upper quadrant, both transverse and sagittal images are

obtained. The patient is status post cholecystectomy. The

pancreas is obscured by overlying bowel gas. No focal liver

lesion is noted. Right kidney shows no hydronephrosis. Right

renal cyst is noted. Common duct measures 8 mm on this

examination. The patient's known choledocholithiasis visualized

on CT is not visualized on this exam. Recommend follow-up ERCP.



IMPRESSION: Patient's known choledocholithiasis not visualized on

this exam. These are however well visualized on the prior CT and

seen best on coronal image 34. Recommend follow-up ERCP.

## 2018-11-22 NOTE — RADIOLOGY REPORT (SQ)
CLINICAL HISTORY:  left sided abdominal pain, nausea 



COMPARISON: None.



TECHNIQUE: CT ABDOMEN PELVIS WITH IV CONTRAST on 11/22/2018 1:52

AM CST



This exam was performed according to our departmental

dose-optimization program, which includes automated exposure

control, adjustment of the mA and/or kV according to patient size

and/or use of iterative reconstruction technique.



FINDINGS: 



Lower lungs are clear.



Abdomen: The liver is normal in appearance. There are multiple

distal common bile duct stones measuring up to 1 cm.

Cholecystectomy was performed. The pancreas and spleen are normal

in appearance. Adrenal glands are normal. Kidneys are mildly

atrophic containing several small simple appearing cyst. There is

a distal fecal impaction.



Abdominal aorta is normal in course and caliber without aneurysm.

There is no free air. There is no retroperitoneal

adenopathy.There is mild intra and extrahepatic biliary

dilatation.



Pelvis: There is large amount of stool throughout the colon.

Urinary bladder is unremarkable. There is no free fluid. Appendix

is normal.



Skeleton: There are no acute osseous findings. No suspicious bony

lesions. Right hip arthroplasty was performed. There is mild

stranding overlying the left hip.



IMPRESSION: 



Mild biliary dilatation secondary to multiple distal common bile

duct stones.



Constipation.

## 2018-11-22 NOTE — ER DOCUMENT REPORT
ED GI/





- General


Stated Complaint: ABDOMINAL PAIN


Time Seen by Provider: 18 00:39


Notes: 





Patient is a 68-year-old male that comes to the emergency department from 

[facility] for chief complaint of left-sided abdominal 

pain.  He states pain is been there for 2 days.  He reports some nausea but 

denies vomiting.  He denies fever, dysuria, chest pain, flank pain.  He states 

he believes his appendix was removed, denies other abdominal surgeries.  He 

denies any daily medications.  Patient is unable to tell me the hospital he is 

in or how he came.





Records from admission at the end of October show that patient has a history of 

CVA, dementia, hypertension, GERD, cholecystectomy.


TRAVEL OUTSIDE OF THE [place] IN LAST 30 DAYS: No





- Related Data


Allergies/Adverse Reactions: 


 





No Known Allergies Allergy (Verified 17 09:01)


 











Past Medical History





- General


Information source: Patient





- Social History


Smoking Status: Never Smoker


Frequency of alcohol use: None


Drug Abuse: None


Lives with: Family


Family History: CAD - Mother  from heart problems, Other - Father  from 

alcoholic cirrhosis





- Past Medical History


Cardiac Medical History: Reports: Hx Hypertension


Renal/ Medical History: Denies: Hx Peritoneal Dialysis


GI Medical History: Reports: Hx Gastroesophageal Reflux Disease, Hx Hepatitis


Musculoskeletal Medical History: Reports Hx Arthritis


Psychiatric Medical History: Reports: Hx Dementia


Traumatic Medical History: Reports: Hx Spine Fracture


Infectious Medical History: Reports: Hx Hepatitis


Past Surgical History: Reports: Hx Cholecystectomy, Hx Nose Surgery, Hx 

Orthopedic Surgery - Total right shoulder, total right hip, vertebrae





- Immunizations


Hx Diphtheria, Pertussis, Tetanus Vaccination: Yes





Review of Systems





- Review of Systems


Constitutional: No symptoms reported


EENT: No symptoms reported


Cardiovascular: No symptoms reported


Respiratory: No symptoms reported


Gastrointestinal: See HPI


Genitourinary: No symptoms reported


Male Genitourinary: No symptoms reported


Musculoskeletal: No symptoms reported


Skin: No symptoms reported


Hematologic/Lymphatic: No symptoms reported


Neurological/Psychological: No symptoms reported





Physical Exam





- Vital signs


Vitals: 


 











Resp Pulse Ox


 


 20   94 


 


 18 01:04  18 01:04














- Notes


Notes: 





GENERAL: Alert, interacts well. No acute distress.


HEAD: Normocephalic, atraumatic.


EYES: Pupils equal, round, and reactive to light. Extraocular movements intact.


ENT: Oral mucosa moist, tongue midline. Oropharynx unremarkable. Airway patent. 

Nares patent, no nasal septal hematoma, TM's intact.


NECK: Full range of motion. Supple. Trachea midline.


LUNGS: Clear to auscultation bilaterally, no wheezes, rales, or rhonchi. No 

respiratory distress.


HEART: Regular rate and rhythm. No murmur


ABDOMEN: Questionably mildly distended, tenderness over the upper abdomen and 

left mid upper quadrant, no guarding, no rigidity, no rebound tenderness.


GENITOURINARY: Deferred


EXTREMITIES: Moves all 4 extremities spontaneously. No edema, normal radial and 

dorsalis pedis pulses bilaterally. No cyanosis.


BACK: no cervical, thoracic, lumbar midline tenderness. No saddle anesthesia, 

normal distal neurovascular exam. 


NEUROLOGICAL: Alert but not oriented.  Cooperative.  Normal speech. [cranial 

nerves II through XII grossly intact]. 


PSYCH: Normal affect, normal mood.


SKIN: Warm, dry, normal turgor. No rashes or lesions noted.





Course





- Re-evaluation


Re-evalutation: 


Patient with abdominal pain generally on exam, worst in the upper abdomen and 

on the left, he reports nausea but denies vomiting.  He is not toxic in 

appearance.  Vital signs unremarkable.





CBC, chemistry, lipase unremarkable.  Urinalysis nonspecific.  Patient 

continues to have abdominal pain on reexamination, as a result CAT scan was 

performed.





CAT scan showing distal choledocholithiasis, patient has had a cholecystectomy.

  Mild dilatation of the common bile duct.  Patient has large amount of stool 

in the colon, fecal impaction.  I discussed with patient, recommended 

disimpaction, he refused, he did allow a superficial rectal exam which showed 

some soft stool in the rectum, and then patient actually did have 2 bowel 

movements, one rather large, with formed stool.  No hard stool, no blood noted.





Discussed with Dr. Quiroga, general surgery, he came down and evaluated the 

images, recommends ultrasound to further evaluate.  He advises that he does see 

stones in the distal common bile duct and there is a concern that if this is 

put off that patient will eventually develop cholangitis.





Ultrasound showing no additional valuable findings.  I discussed with Dr. Dasilva.  Recommends transfer to tertiary [facility] center for ERCP.





18 


Called and spoke with Dr. Pereira, hospitalist at [facility], he will accept patient for transfer.  I did discuss this with patient, 

he does have some dementia, however he does state agreement with this plan and 

gratefulness for care.  Patient has been kept n.p.o.





18 07:00


Report given to DELVIN Mackay PA-C.  Patient has a room assignment, pending transport.





- Vital Signs


Vital signs: 


 











Temp Pulse Resp BP Pulse Ox


 


 98.1 F      14   129/87 H  97 


 


 18 06:01     18 06:01  18 06:01  18 06:01














- Laboratory


Result Diagrams: 


 18 01:17





 18 01:17


Laboratory results interpreted by me: 


 











  18





  01:17 01:32


 


Glucose  128 H 


 


Urine Ketones   TRACE H


 


Urine Urobilinogen   2.0 H














Discharge





- Discharge


Clinical Impression: 


 Choledocholithiasis





Abdominal pain


Qualifiers:


 Abdominal location: generalized Qualified Code(s): R10.84 - Generalized 

abdominal pain





Condition: Stable


Disposition: [facility]


Referrals: 


MIHIR BYRD MD [Primary Care Provider] - Follow up as needed

## 2018-12-23 ENCOUNTER — HOSPITAL ENCOUNTER (EMERGENCY)
Dept: HOSPITAL 62 - ER | Age: 68
Discharge: NURSING, REHAB OR LONG-TERM CARE | End: 2018-12-23
Payer: MEDICARE

## 2018-12-23 VITALS — DIASTOLIC BLOOD PRESSURE: 84 MMHG | SYSTOLIC BLOOD PRESSURE: 122 MMHG

## 2018-12-23 DIAGNOSIS — I10: ICD-10-CM

## 2018-12-23 DIAGNOSIS — F03.90: ICD-10-CM

## 2018-12-23 DIAGNOSIS — Z98.890: ICD-10-CM

## 2018-12-23 DIAGNOSIS — K59.00: ICD-10-CM

## 2018-12-23 DIAGNOSIS — Z90.49: ICD-10-CM

## 2018-12-23 DIAGNOSIS — R10.12: Primary | ICD-10-CM

## 2018-12-23 LAB
ADD MANUAL DIFF: NO
ALBUMIN SERPL-MCNC: 4.1 G/DL (ref 3.5–5)
ALP SERPL-CCNC: 117 U/L (ref 38–126)
ALT SERPL-CCNC: 22 U/L (ref 21–72)
ANION GAP SERPL CALC-SCNC: 8 MMOL/L (ref 5–19)
AST SERPL-CCNC: 31 U/L (ref 17–59)
BASOPHILS # BLD AUTO: 0 10^3/UL (ref 0–0.2)
BASOPHILS NFR BLD AUTO: 0.6 % (ref 0–2)
BILIRUB DIRECT SERPL-MCNC: 0.3 MG/DL (ref 0–0.4)
BILIRUB SERPL-MCNC: 1.3 MG/DL (ref 0.2–1.3)
BUN SERPL-MCNC: 11 MG/DL (ref 7–20)
CALCIUM: 9.9 MG/DL (ref 8.4–10.2)
CHLORIDE SERPL-SCNC: 103 MMOL/L (ref 98–107)
CO2 SERPL-SCNC: 31 MMOL/L (ref 22–30)
EOSINOPHIL # BLD AUTO: 0.3 10^3/UL (ref 0–0.6)
EOSINOPHIL NFR BLD AUTO: 3.8 % (ref 0–6)
ERYTHROCYTE [DISTWIDTH] IN BLOOD BY AUTOMATED COUNT: 14.6 % (ref 11.5–14)
GLUCOSE SERPL-MCNC: 111 MG/DL (ref 75–110)
HCT VFR BLD CALC: 43.6 % (ref 37.9–51)
HGB BLD-MCNC: 14.6 G/DL (ref 13.5–17)
LIPASE SERPL-CCNC: 89.5 U/L (ref 23–300)
LYMPHOCYTES # BLD AUTO: 2.1 10^3/UL (ref 0.5–4.7)
LYMPHOCYTES NFR BLD AUTO: 32.7 % (ref 13–45)
MCH RBC QN AUTO: 30.9 PG (ref 27–33.4)
MCHC RBC AUTO-ENTMCNC: 33.4 G/DL (ref 32–36)
MCV RBC AUTO: 93 FL (ref 80–97)
MONOCYTES # BLD AUTO: 0.5 10^3/UL (ref 0.1–1.4)
MONOCYTES NFR BLD AUTO: 8 % (ref 3–13)
NEUTROPHILS # BLD AUTO: 3.6 10^3/UL (ref 1.7–8.2)
NEUTS SEG NFR BLD AUTO: 54.9 % (ref 42–78)
PLATELET # BLD: 212 10^3/UL (ref 150–450)
POTASSIUM SERPL-SCNC: 4.3 MMOL/L (ref 3.6–5)
PROT SERPL-MCNC: 7.5 G/DL (ref 6.3–8.2)
RBC # BLD AUTO: 4.71 10^6/UL (ref 4.35–5.55)
SODIUM SERPL-SCNC: 142.1 MMOL/L (ref 137–145)
TOTAL CELLS COUNTED % (AUTO): 100 %
WBC # BLD AUTO: 6.6 10^3/UL (ref 4–10.5)

## 2018-12-23 PROCEDURE — 83690 ASSAY OF LIPASE: CPT

## 2018-12-23 PROCEDURE — 74177 CT ABD & PELVIS W/CONTRAST: CPT

## 2018-12-23 PROCEDURE — 96360 HYDRATION IV INFUSION INIT: CPT

## 2018-12-23 PROCEDURE — 85025 COMPLETE CBC W/AUTO DIFF WBC: CPT

## 2018-12-23 PROCEDURE — 83605 ASSAY OF LACTIC ACID: CPT

## 2018-12-23 PROCEDURE — 80053 COMPREHEN METABOLIC PANEL: CPT

## 2018-12-23 PROCEDURE — 36415 COLL VENOUS BLD VENIPUNCTURE: CPT

## 2018-12-23 PROCEDURE — 99285 EMERGENCY DEPT VISIT HI MDM: CPT

## 2018-12-23 NOTE — RADIOLOGY REPORT (SQ)
EXAM DESCRIPTION:  CT ABD/PELVIS WITH IV ONLY



COMPLETED DATE/TIME:  12/23/2018 11:09 am



REASON FOR STUDY:  concern for post operative abscess near splenic fl



COMPARISON:  None.



TECHNIQUE:  CT scan of the abdomen and pelvis performed using helical scanning technique with dynamic
 intravenous contrast injection.  No oral contrast. Images reviewed with lung, soft tissue, and bone 
windows. Reconstructed coronal and sagittal MPR images reviewed. Delayed images for evaluation of the
 urinary system also acquired. All images stored on PACS.

All CT scanners at this facility use dose modulation, iterative reconstruction, and/or weight based d
osing when appropriate to reduce radiation dose to as low as reasonably achievable (ALARA).

[facility]: Dose Right  [facility]: CareDose    [facility]: Dose Right    [facility]: Teradose 4D    [facility]: Smart Technologies



CONTRAST TYPE AND DOSE:  contrast/concentration: Isovue 350.00 mg/ml; Total Contrast Delivered: 87.0 
ml; Total Saline Delivered: 69.0 ml



RENAL FUNCTION:  Creatinine: 0.7



RADIATION DOSE:  CT Rad equipment meets quality standard of care and radiation dose reduction techniq
ues were employed. CTDIvol: 12.1 - 15.0 mGy. DLP: 1513 mGy-cm..



LIMITATIONS:  None.



FINDINGS:  LOWER CHEST: No abnormality.

LIVER: Normal size. No masses.  No dilated ducts.

SPLEEN: Normal size. No focal lesions.

PANCREAS: No abnormality.

GALLBLADDER: Status post cholecystectomy.  Minimal intrahepatic biliary dilatation.  Post cholecystec
naldo prominence of distal common bile duct measuring 9 mm.

ADRENAL GLANDS: No significant masses or asymmetry.

RIGHT KIDNEY AND URETER: Prominent parapelvic and cortical cyst in upper pole right kidney.

LEFT KIDNEY AND URETER: Cortical cyst mid left kidney.

AORTA AND VESSELS: No aneurysm. No dissection. Renal arteries, SMA, celiac without stenosis.

RETROPERITONEUM: No retroperitoneal adenopathy, hemorrhage or masses.

BOWEL AND PERITONEAL CAVITY: Moderate amount of fecal material throughout the colon consistent with c
onstipation.  Colonic diverticulosis.

APPENDIX: The tip of the appendix is prominent measuring 9 mm.  Clinical correlation as to significan
ce needed.  There is no periappendiceal inflammation.  There is air within the appendix extending to 
the a prominent tip of the appendix.

PELVIS: Urinary bladder:  No abnormality.  Prostate and seminal vesicles:  No abnormality.

ABDOMINAL WALL: No masses. No hernias.

BONES: Multilevel lumbar spondylosis with relative central canal narrowing or stenosis noted eft L4-5
 and L3-4.  Status post right total hip prosthesis

OTHER: No other significant finding.



IMPRESSION:  Findings consistent with constipation.  Post cholecystectomy change.



TECHNICAL DOCUMENTATION:  JOB ID:  [id number]

[id number]

Quality ID # [id number]: Final reports with documentation of one or more dose reduction techniques (e.g., Au
tomated exposure control, adjustment of the mA and/or kV according to patient size, use of iterative 
reconstruction technique)

 2011 [employer]- All Rights Reserved



Reading location - IP/workstation name: [email or web address]

## 2018-12-23 NOTE — ER DOCUMENT REPORT
ED General





- General


Chief Complaint: Abdominal Pain


Stated Complaint: LEFT UPPER QUADRANT PAIN


Time Seen by Provider: 18 09:40


Mode of Arrival: Medic


Information source: Patient, Transfer Record, Dr. Office, [facility] Records


Cannot obtain history due to: Dementia


TRAVEL OUTSIDE OF THE [place] IN LAST 30 DAYS: No





- HPI


Patient complains to provider of: side pain


Onset: Other - 68-year-old gentleman with dementia who presents for evaluation 

from [facility]. He recently underwent ERCP and cholecystectomy 

for choledocholithiasis and had been doing well for the 2 weeks thereafter. 

Today he says that his left side hurts. Per nursing records he has been having 

some pain in the left side since yesterday.  Nothing is seem to make it better 

or worse. Slight complain about anything else.





- Related Data


Allergies/Adverse Reactions: 


                                        





No Known Allergies Allergy (Verified 17 09:01)


   











Past Medical History





- General


Information source: Patient, Emergency Med Personnel, [facility] Records





- Social History


Smoking Status: Never Smoker


Family History: CAD - Mother  from heart problems, Other - Father  from 

alcoholic cirrhosis


Patient has suicidal ideation: No


Patient has homicidal ideation: No





- Past Medical History


Cardiac Medical History: Reports: Hx Hypertension


Renal/ Medical History: Denies: Hx Peritoneal Dialysis


GI Medical History: Reports: Hx Gastroesophageal Reflux Disease, Hx Hepatitis


Musculoskeletal Medical History: Reports Hx Arthritis


Psychiatric Medical History: Reports: Hx Dementia


Traumatic Medical History: Reports: Hx Spine Fracture


Infectious Medical History: Reports: Hx Hepatitis


Past Surgical History: Reports: Hx Cholecystectomy, Hx Nose Surgery, Hx 

Orthopedic Surgery - Total right shoulder, total right hip, vertebrae





- Immunizations


Hx Diphtheria, Pertussis, Tetanus Vaccination: Yes





Review of Systems





- Review of Systems


-: Yes All other systems reviewed and negative





Physical Exam





- Vital signs


Vitals: 


                                        











Temp Pulse Resp BP Pulse Ox


 


 98.1 F   82   16   137/88 H  100 


 


 18 09:24  18 09:24  18 09:24  18 09:24  18 09:24














- General


General appearance: Appears well


In distress: None





- HEENT


Head: Normocephalic


Eyes: Normal


Conjunctiva: Normal


Cornea: Normal


Extraocular movements intact: Yes


Eyelashes: Normal





- Respiratory


Respiratory status: No respiratory distress


Chest status: Nontender


Breath sounds: Normal


Chest palpation: Normal





- Cardiovascular


Rhythm: Regular


Heart sounds: Normal auscultation


Murmur: No





- Abdominal


Inspection: Normal


Distension: No distension


Bowel sounds: Normal


Tenderness: Tender - Diffuse tenderness most prominent in the left upper 

quadrant without any focal rebound or guarding





- Back


Back: Normal





- Extremities


General upper extremity: Normal inspection, Nontender, Normal color, Normal ROM,

Normal temperature


General lower extremity: Normal inspection, Nontender, Normal color, Normal ROM,

Normal temperature, Normal weight bearing.  No: Edward's sign





- Neurological


Neuro grossly intact: Yes


Cognition: Normal


Orientation: Disoriented to place, Disoriented to time


[place] Coma Scale Eye Opening: Spontaneous


[place] Coma Scale Verbal: Oriented


[place] Coma Scale Motor: Obeys Commands


[place] Coma Scale Total: 15


Speech: Normal


Motor strength normal: LUE, RUE, LLE, RLE


Sensory: Normal





- Psychological


Associated symptoms: Normal affect, Normal mood





Course





- Re-evaluation


Re-evalutation: 


6-year-old man 2 weeks status post cholecystectomy and ERCP.


He has vague abdominal pain along the left side.


He is otherwise well-appearing with an otherwise reassuring abdominal exam and 

because of his recent surgery do believe this represents potential underlying 

abscess or some other more serious problem.


His dementia greatly limits the rest of his history.


Almost all history is obtained from medical records.


CT of the abdomen and pelvis demonstrates marked stool burden with a large piece

of feces in the rectum.


Lactate initially is 3.


Otherwise labs are reassuring.


Performed a rectal examination to ensure this is not a large obstructing fecali

th.


Rectal examination is notable only for soft stool in the rectum.


We will plan for administration of enema at this time.


Following administration of enema patient had copious production of stool.  His 

abdominal pain resolved entirely thereafter his abdominal pain remained benign. 

Because of his initially elevated lactate he had been given a liter of fluids 

rechecked his lactate is now 1.


Current plan is for this patient undergo discharge with return precautions and 

follow-up at [facility] with an aggressive bowel regimen.








- Vital Signs


Vital signs: 


                                        











Temp Pulse Resp BP Pulse Ox


 


 98.0 F   76   16   122/84   100 


 


 18 17:45  18 17:45  18 17:45  18 17:45  18 17:45














- Laboratory


Result Diagrams: 


                                 18 10:00





                                 18 10:00


Laboratory results interpreted by me: 


                                        











  18





  10:00 10:00 10:25


 


RDW  14.6 H  


 


Carbon Dioxide   31 H 


 


Glucose   111 H 


 


Lactic Acid    2.8 H














Discharge





- Discharge


Clinical Impression: 


 Flank pain





Abdominal pain


Qualifiers:


 Abdominal location: unspecified location Qualified Code(s): R10.9 - Unspecified

abdominal pain





Constipation


Qualifiers:


 Constipation type: unspecified constipation type Qualified Code(s): K59.00 - 

Constipation, unspecified





Condition: Good


Disposition: HOME-SNF (ED ONLY)


Instructions:  Abdominal Pain ([facility]), Bulk Laxatives


Additional Instructions: 


Your seen today in the emergency department for your abdominal pain and 

constipation.


You had an evaluation including a physical exam as well as blood work and a CAT 

scan.


The CAT scan showed a large amount of stool.


Make sure you are taking the stool softener prescribed to you as directed twice 

daily.


You need to have at least one large bowel movement every day.


If you begin to have fevers, chills, or vomiting and cannot keep anything down 

please return to the emergency room because it could be a more serious 

condition.


Prescriptions: 


Polyethylene Glycol 3350 [Miralax Powder 17 gm/Packet] 1 packet PO BID #1 pkg


Referrals: 


OBNI HURST DO [Primary Care Provider] - Follow up as needed